# Patient Record
Sex: MALE | Race: WHITE | Employment: OTHER | URBAN - METROPOLITAN AREA
[De-identification: names, ages, dates, MRNs, and addresses within clinical notes are randomized per-mention and may not be internally consistent; named-entity substitution may affect disease eponyms.]

---

## 2017-08-22 ENCOUNTER — APPOINTMENT (OUTPATIENT)
Dept: GENERAL RADIOLOGY | Age: 72
DRG: 536 | End: 2017-08-22
Attending: PHYSICIAN ASSISTANT
Payer: MEDICARE

## 2017-08-22 ENCOUNTER — HOSPITAL ENCOUNTER (INPATIENT)
Age: 72
LOS: 3 days | Discharge: NURSING, REHAB OR LONG-TERM CARE | DRG: 536 | End: 2017-08-25
Attending: FAMILY MEDICINE | Admitting: HOSPITALIST
Payer: MEDICARE

## 2017-08-22 ENCOUNTER — APPOINTMENT (OUTPATIENT)
Dept: CT IMAGING | Age: 72
DRG: 536 | End: 2017-08-22
Attending: PHYSICIAN ASSISTANT
Payer: MEDICARE
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
## 2017-08-22 DIAGNOSIS — S72.002A CLOSED LEFT HIP FRACTURE, INITIAL ENCOUNTER (HCC): Primary | ICD-10-CM

## 2017-08-22 LAB
ALBUMIN SERPL-MCNC: 3.5 G/DL (ref 3.4–5)
ALBUMIN/GLOB SERPL: 0.9 {RATIO} (ref 0.8–1.7)
ALP SERPL-CCNC: 62 U/L (ref 45–117)
ALT SERPL-CCNC: 33 U/L (ref 16–61)
ANION GAP SERPL CALC-SCNC: 7 MMOL/L (ref 3–18)
APPEARANCE UR: CLEAR
APTT PPP: 25.8 SEC (ref 23–36.4)
AST SERPL-CCNC: 35 U/L (ref 15–37)
BASOPHILS # BLD: 0 K/UL (ref 0–0.06)
BASOPHILS NFR BLD: 0 % (ref 0–2)
BILIRUB SERPL-MCNC: 0.3 MG/DL (ref 0.2–1)
BILIRUB UR QL: NEGATIVE
BUN SERPL-MCNC: 27 MG/DL (ref 7–18)
BUN/CREAT SERPL: 36 (ref 12–20)
CALCIUM SERPL-MCNC: 8.8 MG/DL (ref 8.5–10.1)
CHLORIDE SERPL-SCNC: 105 MMOL/L (ref 100–108)
CO2 SERPL-SCNC: 28 MMOL/L (ref 21–32)
COLOR UR: YELLOW
CREAT SERPL-MCNC: 0.76 MG/DL (ref 0.6–1.3)
DIFFERENTIAL METHOD BLD: ABNORMAL
EOSINOPHIL # BLD: 0 K/UL (ref 0–0.4)
EOSINOPHIL NFR BLD: 0 % (ref 0–5)
ERYTHROCYTE [DISTWIDTH] IN BLOOD BY AUTOMATED COUNT: 12.6 % (ref 11.6–14.5)
GLOBULIN SER CALC-MCNC: 3.9 G/DL (ref 2–4)
GLUCOSE SERPL-MCNC: 93 MG/DL (ref 74–99)
GLUCOSE UR STRIP.AUTO-MCNC: NEGATIVE MG/DL
HCT VFR BLD AUTO: 38.4 % (ref 36–48)
HGB BLD-MCNC: 12.6 G/DL (ref 13–16)
HGB UR QL STRIP: NEGATIVE
INR PPP: 1.1 (ref 0.8–1.2)
KETONES UR QL STRIP.AUTO: NEGATIVE MG/DL
LEUKOCYTE ESTERASE UR QL STRIP.AUTO: NEGATIVE
LYMPHOCYTES # BLD: 1.9 K/UL (ref 0.9–3.6)
LYMPHOCYTES NFR BLD: 13 % (ref 21–52)
MCH RBC QN AUTO: 30 PG (ref 24–34)
MCHC RBC AUTO-ENTMCNC: 32.8 G/DL (ref 31–37)
MCV RBC AUTO: 91.4 FL (ref 74–97)
MONOCYTES # BLD: 1 K/UL (ref 0.05–1.2)
MONOCYTES NFR BLD: 7 % (ref 3–10)
NEUTS SEG # BLD: 11.7 K/UL (ref 1.8–8)
NEUTS SEG NFR BLD: 80 % (ref 40–73)
NITRITE UR QL STRIP.AUTO: NEGATIVE
PH UR STRIP: 7 [PH] (ref 5–8)
PLATELET # BLD AUTO: 205 K/UL (ref 135–420)
PMV BLD AUTO: 9.3 FL (ref 9.2–11.8)
POTASSIUM SERPL-SCNC: 4 MMOL/L (ref 3.5–5.5)
PROT SERPL-MCNC: 7.4 G/DL (ref 6.4–8.2)
PROT UR STRIP-MCNC: NEGATIVE MG/DL
PROTHROMBIN TIME: 13.5 SEC (ref 11.5–15.2)
RBC # BLD AUTO: 4.2 M/UL (ref 4.7–5.5)
RBC MORPH BLD: ABNORMAL
SODIUM SERPL-SCNC: 140 MMOL/L (ref 136–145)
SP GR UR REFRACTOMETRY: 1.01 (ref 1–1.03)
UROBILINOGEN UR QL STRIP.AUTO: 0.2 EU/DL (ref 0.2–1)
WBC # BLD AUTO: 14.6 K/UL (ref 4.6–13.2)

## 2017-08-22 PROCEDURE — 93005 ELECTROCARDIOGRAM TRACING: CPT

## 2017-08-22 PROCEDURE — 96361 HYDRATE IV INFUSION ADD-ON: CPT

## 2017-08-22 PROCEDURE — 85610 PROTHROMBIN TIME: CPT | Performed by: PHYSICIAN ASSISTANT

## 2017-08-22 PROCEDURE — 74011250636 HC RX REV CODE- 250/636: Performed by: INTERNAL MEDICINE

## 2017-08-22 PROCEDURE — 85025 COMPLETE CBC W/AUTO DIFF WBC: CPT | Performed by: PHYSICIAN ASSISTANT

## 2017-08-22 PROCEDURE — 51702 INSERT TEMP BLADDER CATH: CPT

## 2017-08-22 PROCEDURE — 73700 CT LOWER EXTREMITY W/O DYE: CPT

## 2017-08-22 PROCEDURE — 72110 X-RAY EXAM L-2 SPINE 4/>VWS: CPT

## 2017-08-22 PROCEDURE — 71010 XR CHEST PORT: CPT

## 2017-08-22 PROCEDURE — 74011250637 HC RX REV CODE- 250/637: Performed by: PHYSICIAN ASSISTANT

## 2017-08-22 PROCEDURE — 81003 URINALYSIS AUTO W/O SCOPE: CPT | Performed by: PHYSICIAN ASSISTANT

## 2017-08-22 PROCEDURE — 65270000029 HC RM PRIVATE

## 2017-08-22 PROCEDURE — 85730 THROMBOPLASTIN TIME PARTIAL: CPT | Performed by: PHYSICIAN ASSISTANT

## 2017-08-22 PROCEDURE — 99285 EMERGENCY DEPT VISIT HI MDM: CPT

## 2017-08-22 PROCEDURE — 77030034849

## 2017-08-22 PROCEDURE — 73502 X-RAY EXAM HIP UNI 2-3 VIEWS: CPT

## 2017-08-22 PROCEDURE — 74011250636 HC RX REV CODE- 250/636: Performed by: PHYSICIAN ASSISTANT

## 2017-08-22 PROCEDURE — 96374 THER/PROPH/DIAG INJ IV PUSH: CPT

## 2017-08-22 PROCEDURE — 80053 COMPREHEN METABOLIC PANEL: CPT | Performed by: PHYSICIAN ASSISTANT

## 2017-08-22 RX ORDER — SODIUM CHLORIDE 9 MG/ML
75 INJECTION, SOLUTION INTRAVENOUS CONTINUOUS
Status: DISCONTINUED | OUTPATIENT
Start: 2017-08-23 | End: 2017-08-25 | Stop reason: HOSPADM

## 2017-08-22 RX ORDER — HYDROMORPHONE HYDROCHLORIDE 1 MG/ML
1 INJECTION, SOLUTION INTRAMUSCULAR; INTRAVENOUS; SUBCUTANEOUS
Status: DISCONTINUED | OUTPATIENT
Start: 2017-08-22 | End: 2017-08-24

## 2017-08-22 RX ORDER — FENTANYL CITRATE 50 UG/ML
50 INJECTION, SOLUTION INTRAMUSCULAR; INTRAVENOUS ONCE
Status: COMPLETED | OUTPATIENT
Start: 2017-08-22 | End: 2017-08-22

## 2017-08-22 RX ORDER — HYDROMORPHONE HYDROCHLORIDE 1 MG/ML
1 INJECTION, SOLUTION INTRAMUSCULAR; INTRAVENOUS; SUBCUTANEOUS ONCE
Status: COMPLETED | OUTPATIENT
Start: 2017-08-22 | End: 2017-08-22

## 2017-08-22 RX ORDER — ONDANSETRON 4 MG/1
4 TABLET, ORALLY DISINTEGRATING ORAL
Status: COMPLETED | OUTPATIENT
Start: 2017-08-22 | End: 2017-08-22

## 2017-08-22 RX ADMIN — SODIUM CHLORIDE 75 ML/HR: 900 INJECTION, SOLUTION INTRAVENOUS at 23:34

## 2017-08-22 RX ADMIN — SODIUM CHLORIDE 1000 ML: 900 INJECTION, SOLUTION INTRAVENOUS at 13:56

## 2017-08-22 RX ADMIN — HYDROMORPHONE HYDROCHLORIDE 1 MG: 1 INJECTION, SOLUTION INTRAMUSCULAR; INTRAVENOUS; SUBCUTANEOUS at 20:46

## 2017-08-22 RX ADMIN — FENTANYL CITRATE 50 MCG: 50 INJECTION, SOLUTION INTRAMUSCULAR; INTRAVENOUS at 11:36

## 2017-08-22 RX ADMIN — ONDANSETRON 4 MG: 4 TABLET, ORALLY DISINTEGRATING ORAL at 11:36

## 2017-08-22 NOTE — ED TRIAGE NOTES
Pt w/ cutting the grass around 0930 when he tripped and fell. Pt denies LOC or head trauma but reports left hip pain, rated 4/10. Pt able to move LLE but reports that he is only able to bear minimal weight on that leg, PVS intact. ELISA Mckeon to bedside and orders to be placed for pain medication per protocol. Sister at bedside. Sepsis Screening completed    (  )Patient meets SIRS criteria. ( X )Patient does not meet SIRS criteria.       SIRS Criteria is achieved when two or more of the following are present   Temperature < 96.8°F (36°C) or > 100.9°F (38.3°C)   Heart Rate > 90 beats per minute   Respiratory Rate > 20 breaths per minute   WBC count > 12,000 or <4,000 or > 10% bands

## 2017-08-22 NOTE — ED NOTES
Bed linens changed due to urine spilling onto sheets. Warm blankets provided. Pt assisted to log roll. Tolerated procedure well. Pt resting in stretcher with HOB elevated. Bowers catheter to be placed and urine specimen sent to lab. CT scan pending. Pt in NAD at this time. Sister remains at bedside.

## 2017-08-22 NOTE — ED PROVIDER NOTES
HPI Comments: 11:20 AM  Nagi Bee is a 71 y.o. male with a PMHx of mild mental retardation, BPH, COPD, who presents to the ED c/o left hip pain s/p mechanical fall which occurred 2 hours PTA. Pt states he tripped while he was cutting the grass. Pain to left buttocks. Worse with movement. Better with rest. Associated sxs include a small abrasion around the left knee. Pt takes Advil for arthralgias and Centrum. SHx of 2 laminectomies (one with a fixture), 2 shoulder surgeries, and right hip ORIF (s/p fall and fx). Pt is followed by [facility]. Last Tdap unknown. Patient denies other pain, head trauma, HA, LOC, and any other sxs or complaints. Patient is a 71 y.o. male presenting with fall. The history is provided by the patient and a caregiver. No  was used. Fall   The accident occurred 1 to 2 hours ago. Fall occurred: cutting grass. The pain is present in the left hip. Pertinent negatives include no numbness, no vomiting and no loss of consciousness. The risk factors include recurrent falls (has hx of fall, mild mental retardation). It is unknown when the patient last had a tetanus shot.         Past Medical History:   Diagnosis Date    Arthritis     DJD    BPH (benign prostatic hypertrophy)     Cataracts, bilateral     Chronic kidney disease     stone    COPD     Dx with mild COPD- [facility])     Ill-defined condition     dermatitis- side of mouth    Lymphedema of lower extremity     right    Mild mental retardation     high functioning       Past Surgical History:   Procedure Laterality Date    HX GI  2005    colonoscopy    HX HERNIA REPAIR      right inguinal as a child    HX LUMBAR FUSION  2008    L2-3    HX LUMBAR FUSION  4-22-13    L2-5    HX MOHS PROCEDURES Left     HX ORTHOPAEDIC      right shoulder and hip    HX ORTHOPAEDIC  2007    right hip pinning    HX ORTHOPAEDIC  2012    right total shoulder    HX PROSTATECTOMY 8-1-13    Greenlight laser    HX UROLOGICAL  8-1-2013    green light laser    SPINE SURGERY PROCEDURE UNLISTED           Family History:   Problem Relation Age of Onset    Malignant Hyperthermia Neg Hx     Pseudocholinesterase Deficiency Neg Hx     Delayed Awakening Neg Hx     Post-op Nausea/Vomiting Neg Hx     Emergence Delirium Neg Hx     Post-op Cognitive Dysfunction Neg Hx     Cancer Mother      colon cancer       Social History     Social History    Marital status: SINGLE     Spouse name: N/A    Number of children: N/A    Years of education: N/A     Occupational History    Not on file. Social History Main Topics    Smoking status: Former Smoker     Quit date: 7/25/2008    Smokeless tobacco: Never Used      Comment: Occ. small cigar    Alcohol use 0.5 oz/week     1 Cans of beer per week      Comment: ocassional (Once every 2 weeks).  Drug use: No    Sexual activity: Not on file     Other Topics Concern    Metallic Foreign Body Yes     rt hip and rt arm metal     Social History Narrative    No narrative on file         ALLERGIES: Milk    Review of Systems   Constitutional: Positive for activity change. HENT: Negative for facial swelling. Respiratory: Negative for shortness of breath. Cardiovascular: Negative for chest pain. Gastrointestinal: Negative for vomiting. Musculoskeletal: Positive for arthralgias (left hip pain) and gait problem. Negative for neck pain and neck stiffness. (-) any other pain   Skin: Positive for wound (small abrasion around the left knee). Neurological: Negative for loss of consciousness, syncope and numbness. Hematological: Negative for adenopathy. All other systems reviewed and are negative.       Vitals:    08/22/17 1530 08/22/17 1600 08/22/17 1700 08/22/17 1730   BP: 137/58 136/65 127/71 131/71   Pulse:  72  72   Resp:  18  18   Temp:    99.8 °F (37.7 °C)   SpO2: 97% 99% 98% 98%   Weight:       Height:                Physical Exam Constitutional: He is oriented to person, place, and time. He appears well-developed and well-nourished. No distress.  geriatric male in NAD. Alert. Answering questions. Slow to respond at times but oriented. HENT:   Head: Normocephalic and atraumatic. Eyes: Conjunctivae are normal.   Neck: Normal range of motion. No spinous process tenderness and no muscular tenderness present. No rigidity. No edema, no erythema and normal range of motion present. Cardiovascular: Normal rate, regular rhythm, normal heart sounds and intact distal pulses. Exam reveals no friction rub. No murmur heard. Pulses:       Dorsalis pedis pulses are 2+ on the right side, and 2+ on the left side. Posterior tibial pulses are 2+ on the right side, and 2+ on the left side. Pulmonary/Chest: Effort normal and breath sounds normal. No respiratory distress. He has no wheezes. He has no rales. Musculoskeletal:        Left hip: He exhibits decreased range of motion and tenderness. Left upper leg: He exhibits no tenderness, no bony tenderness, no swelling and no edema. Legs:  Neurological: He is alert and oriented to person, place, and time. Skin: He is not diaphoretic. Nursing note and vitals reviewed. RESULTS:    PULSE OXIMETRY NOTE:  Pulse-ox is 96% on RA  Interpretation: Normal       EKG interpretation: (Preliminary)  Read at 1:40 PM by ELIAN Faulkner; 74 bpm; No ST Elevation  Written by Brynn Spangler ED Scribángel    XR SPINE LUMB MIN 4 V   Final Result   IMPRESSION:    1. Extension of posterior instrumented spinal fusion in the interval from prior  lumbar spine radiographs of July 22, 2012. Lucency is noted surrounding all  paired pedicular screws, greatest at L2. Comparison with more recent prior  imaging of the lumbar spine is recommended.  If no more recent comparison imaging  is available, further evaluation with nonemergent flexion and extension  projections may be helpful for evaluation of potential instability. 2. No acute radiographic abnormality involving the lumbar spine. 3. Trace anterolisthesis L4 on L5. As read by the radiologist.     XR HIP LT W OR WO PELV 2-3 VWS   Final Result   IMPRESSION:  1. Nondisplaced, mildly comminuted fracture of the left hip greater trochanter,  with slight extension into the proximal intertrochanteric femur. 2. Healed, reduced, and nailed proximal right femoral fracture. As read by the radiologist.      XR CHEST PORT   Final Result   IMPRESSION:     No acute cardiopulmonary disease or significant change. Mild parenchymal  scarring.       As read by the radiologist.     CT Left hip w/o contrast  IMPRESSION:     1. Comminuted minimally displaced fracture of the left greater trochanter  without dislocation of the left femoral head. As read by radiology    Labs Reviewed   CBC WITH AUTOMATED DIFF - Abnormal; Notable for the following:        Result Value    WBC 14.6 (*)     RBC 4.20 (*)     HGB 12.6 (*)     NEUTROPHILS 80 (*)     LYMPHOCYTES 13 (*)     ABS. NEUTROPHILS 11.7 (*)     All other components within normal limits   METABOLIC PANEL, COMPREHENSIVE - Abnormal; Notable for the following:     BUN 27 (*)     BUN/Creatinine ratio 36 (*)     All other components within normal limits   PROTHROMBIN TIME + INR   PTT   URINALYSIS W/ RFLX MICROSCOPIC       Recent Results (from the past 12 hour(s))   CBC WITH AUTOMATED DIFF    Collection Time: 08/22/17  1:30 PM   Result Value Ref Range    WBC 14.6 (H) 4.6 - 13.2 K/uL    RBC 4.20 (L) 4.70 - 5.50 M/uL    HGB 12.6 (L) 13.0 - 16.0 g/dL    HCT 38.4 36.0 - 48.0 %    MCV 91.4 74.0 - 97.0 FL    MCH 30.0 24.0 - 34.0 PG    MCHC 32.8 31.0 - 37.0 g/dL    RDW 12.6 11.6 - 14.5 %    PLATELET 815 710 - 024 K/uL    MPV 9.3 9.2 - 11.8 FL    NEUTROPHILS 80 (H) 40 - 73 %    LYMPHOCYTES 13 (L) 21 - 52 %    MONOCYTES 7 3 - 10 %    EOSINOPHILS 0 0 - 5 %    BASOPHILS 0 0 - 2 %    ABS.  NEUTROPHILS 11.7 (H) 1.8 - 8.0 K/UL ABS. LYMPHOCYTES 1.9 0.9 - 3.6 K/UL    ABS. MONOCYTES 1.0 0.05 - 1.2 K/UL    ABS. EOSINOPHILS 0.0 0.0 - 0.4 K/UL    ABS. BASOPHILS 0.0 0.0 - 0.06 K/UL    RBC COMMENTS NORMOCYTIC, NORMOCHROMIC      DF AUTOMATED     METABOLIC PANEL, COMPREHENSIVE    Collection Time: 08/22/17  1:30 PM   Result Value Ref Range    Sodium 140 136 - 145 mmol/L    Potassium 4.0 3.5 - 5.5 mmol/L    Chloride 105 100 - 108 mmol/L    CO2 28 21 - 32 mmol/L    Anion gap 7 3.0 - 18 mmol/L    Glucose 93 74 - 99 mg/dL    BUN 27 (H) 7.0 - 18 MG/DL    Creatinine 0.76 0.6 - 1.3 MG/DL    BUN/Creatinine ratio 36 (H) 12 - 20      GFR est AA >60 >60 ml/min/1.73m2    GFR est non-AA >60 >60 ml/min/1.73m2    Calcium 8.8 8.5 - 10.1 MG/DL    Bilirubin, total 0.3 0.2 - 1.0 MG/DL    ALT (SGPT) 33 16 - 61 U/L    AST (SGOT) 35 15 - 37 U/L    Alk.  phosphatase 62 45 - 117 U/L    Protein, total 7.4 6.4 - 8.2 g/dL    Albumin 3.5 3.4 - 5.0 g/dL    Globulin 3.9 2.0 - 4.0 g/dL    A-G Ratio 0.9 0.8 - 1.7     PROTHROMBIN TIME + INR    Collection Time: 08/22/17  1:30 PM   Result Value Ref Range    Prothrombin time 13.5 11.5 - 15.2 sec    INR 1.1 0.8 - 1.2     PTT    Collection Time: 08/22/17  1:30 PM   Result Value Ref Range    aPTT 25.8 23.0 - 36.4 SEC   EKG, 12 LEAD, INITIAL    Collection Time: 08/22/17  1:39 PM   Result Value Ref Range    Ventricular Rate 74 BPM    Atrial Rate 74 BPM    P-R Interval 170 ms    QRS Duration 88 ms    Q-T Interval 366 ms    QTC Calculation (Bezet) 406 ms    Calculated P Axis 21 degrees    Calculated R Axis -53 degrees    Calculated T Axis 0 degrees    Diagnosis       Normal sinus rhythm  Left anterior fascicular block  Possible Lateral infarct , age undetermined  Abnormal ECG  When compared with ECG of 16-APR-2013 14:36,  Borderline criteria for Lateral infarct are now present     URINALYSIS W/ RFLX MICROSCOPIC    Collection Time: 08/22/17  2:15 PM   Result Value Ref Range    Color YELLOW      Appearance CLEAR      Specific gravity 1.010 1.005 - 1.030      pH (UA) 7.0 5.0 - 8.0      Protein NEGATIVE  NEG mg/dL    Glucose NEGATIVE  NEG mg/dL    Ketone NEGATIVE  NEG mg/dL    Bilirubin NEGATIVE  NEG      Blood NEGATIVE  NEG      Urobilinogen 0.2 0.2 - 1.0 EU/dL    Nitrites NEGATIVE  NEG      Leukocyte Esterase NEGATIVE  NEG         MDM  Number of Diagnoses or Management Options  Closed left hip fracture, initial encounter [facility]): Fall from slip, trip, or stumble, initial encounter:   Diagnosis management comments: Fx, sprain, strain, dislocation, ligamentous injury       Amount and/or Complexity of Data Reviewed  Clinical lab tests: ordered and reviewed  Tests in the radiology section of CPT®: ordered and reviewed (CXR, XR left hip, XR L-Spine)  Tests in the medicine section of CPT®: ordered and reviewed (ekg)  Independent visualization of images, tracings, or specimens: yes (CXR, XR left hip, XR L-Spine, EKG)      ED Course     MEDICATIONS GIVEN:  Medications   fentaNYL citrate (PF) injection 50 mcg (50 mcg IntraNASal Given 8/22/17 1136)   ondansetron (ZOFRAN ODT) tablet 4 mg (4 mg Oral Given 8/22/17 1136)   sodium chloride 0.9 % bolus infusion 1,000 mL (0 mL IntraVENous IV Completed 8/22/17 1600)       Procedures    PROGRESS NOTE:   11:20 AM  Initial assesment performed. Written by Nicole Gill, ED Scribe, as dictated by James Hayes PA-C.     CONSULT NOTE:   1:39 PM  Faith Underwood with Fabien Oden MD   Specialty: Orthopedics  Discussed pt's hx, disposition, and available diagnostic and imaging results over the telephone. Reviewed care plans. Consulting physician agrees with plans as outlined. Advises a left hip CT. PROGRESS NOTE:   2:20 PM  Pt has been re-examined by James Hayes PA-C  Pt would like to see [facility] and not the on call clinician for a repair as they have an existing relationship with this practice. Will contact on call doc for [facility]. Waiting on CT.     CONSULT NOTE:   3:05 PM  Faith Underwood with Jeff Hawk MD Specialty: [id number] [facility] with workup, agrees CT is need to better define the fx. Advises talking with medicine for admission, as this is the policy to admit all hip fxs. CONSULT NOTE:   3:23 PM  Tacos Francois PA-C spoke with Aden Nuñez MD   Specialty: Hospitalist  Discussed pt's hx, disposition, and available diagnostic and imaging results. Reviewed care plans. Consulting physician agrees with plans as outlined. Agrees to admit pt to Orthopedics. Accepts the pt, but states that his understanding was that hip fxs that do not need medical clearance should go to orthopedist as the principle admitting clinician. I discussed the pts PMH, also discussed that I brought this up with Dr. Angelina Dolan, and Dr. Beau Garland understanding is that all hip fxs go to medicine. Discussed that they may discuss who will be the principle admitting physician, and will admit to Orthopedics under Dr. Carlyle Nyhan. Written by Miguelito Padilla, ED Scribe, as dictated by Tacos Francois PA-C    ADMISSION NOTE:  3:23 PM  Patient is being admitted to Orthopedics by Aden Nuñez MD. The results of their tests and reasons for their admission have been discussed with them and/or available family. They convey agreement and understanding for the need to be admitted and for their admission diagnosis. Written by Miguelito Padilla, AIDAN Scribe, as dictated by Tacos Francois PA-C.    CONDITIONS ON ADMISSION:  Deep Vein Thrombosis is not present at the time of admission. Thrombosis is not present at the time of admission. Urinary Tract Infection is not present at the time of admission. Pneumonia is not present at the time of admission. MRSA is not present at the time of admission. Wound infection is not present at the time of admission. Pressure Ulcer is not present at the time of admission. CLINICAL IMPRESSION    1. Closed left hip fracture, initial encounter ([facility] [address]    2.  Fall from slip, trip, or stumble, initial encounter          3:25 PM  I have spent 30 minutes of critical care time involved in lab review, consultations with specialist, family decision-making, and documentation. During this entire length of time I was immediately available to the patient. Critical Care: The reason for providing this level of medical care for this critically ill patient was due a critical illness that impaired one or more vital organ systems such that there was a high probability of imminent or life threatening deterioration in the patients condition. This care involved high complexity decision making to assess, manipulate, and support vital system functions, to treat this degreee vital organ system failure and to prevent further life threatening deterioration of the patients condition. ATTESTATION:  This note is prepared by Ag Grayson, acting as a Scribe for Radha Koehler PA-C on 11:20 AM on 8/22/2017 .     Radha Koehler PA-C: The scribe's documentation has been prepared under my direction and personally reviewed by me in its entirety.'

## 2017-08-22 NOTE — ED NOTES
Rounded on pt. Pt resting in stretcher in NAD at this time with sister at bedside. Pt continues to deny needing pain medication. Will continue to re-assess. Bowers draining clear, yellow urine. Call light within reach. Pt updated on plan of care regarding IP admission and pending CT scan. No needs verbalized at this time.

## 2017-08-22 NOTE — ED NOTES
Bowers catheter placed, draining large amount of clear, yellow urine. Pt tolerated procedure well. Pt currently rates pain as 5/10. Denies wanting pain medication at this time. Will re-assess later. CT scan pending. Call light within reach. Sister to bedside.

## 2017-08-22 NOTE — ED NOTES
Rounded on pt. I&O measured. Pt in NAD at this time. Pt updated on plan of care. No needs verbalized at this time.

## 2017-08-22 NOTE — ROUTINE PROCESS
TRANSFER - OUT REPORT:    Verbal report given to Dillon Vides LPN on [facility]  being transferred to Surgical for routine progression of care       Report consisted of patients Situation, Background, Assessment and   Recommendations(SBAR). Information from the following report(s) SBAR, ED Summary, Intake/Output, MAR, Recent Results and Cardiac Rhythm NSR was reviewed with the receiving nurse. Lines:   Peripheral IV 08/22/17 Left Antecubital (Active)   Site Assessment Clean, dry, & intact 8/22/2017  1:31 PM   Phlebitis Assessment 0 8/22/2017  1:31 PM   Infiltration Assessment 0 8/22/2017  1:31 PM   Dressing Status Clean, dry, & intact 8/22/2017  1:31 PM   Dressing Type Transparent 8/22/2017  1:31 PM   Hub Color/Line Status Patent; Flushed 8/22/2017  1:31 PM   Action Taken Blood drawn 8/22/2017  1:31 PM        Opportunity for questions and clarification was provided.       Patient transported with:   ilustrum

## 2017-08-23 LAB
ANION GAP SERPL CALC-SCNC: 7 MMOL/L (ref 3–18)
BUN SERPL-MCNC: 25 MG/DL (ref 7–18)
BUN/CREAT SERPL: 27 (ref 12–20)
CALCIUM SERPL-MCNC: 8.9 MG/DL (ref 8.5–10.1)
CHLORIDE SERPL-SCNC: 104 MMOL/L (ref 100–108)
CO2 SERPL-SCNC: 27 MMOL/L (ref 21–32)
CREAT SERPL-MCNC: 0.91 MG/DL (ref 0.6–1.3)
ERYTHROCYTE [DISTWIDTH] IN BLOOD BY AUTOMATED COUNT: 13 % (ref 11.6–14.5)
GLUCOSE SERPL-MCNC: 114 MG/DL (ref 74–99)
HCT VFR BLD AUTO: 37.8 % (ref 36–48)
HGB BLD-MCNC: 12.2 G/DL (ref 13–16)
MCH RBC QN AUTO: 30.1 PG (ref 24–34)
MCHC RBC AUTO-ENTMCNC: 32.3 G/DL (ref 31–37)
MCV RBC AUTO: 93.3 FL (ref 74–97)
PLATELET # BLD AUTO: 195 K/UL (ref 135–420)
PMV BLD AUTO: 9.4 FL (ref 9.2–11.8)
POTASSIUM SERPL-SCNC: 4 MMOL/L (ref 3.5–5.5)
RBC # BLD AUTO: 4.05 M/UL (ref 4.7–5.5)
SODIUM SERPL-SCNC: 138 MMOL/L (ref 136–145)
WBC # BLD AUTO: 12.7 K/UL (ref 4.6–13.2)

## 2017-08-23 PROCEDURE — 97162 PT EVAL MOD COMPLEX 30 MIN: CPT

## 2017-08-23 PROCEDURE — 65270000029 HC RM PRIVATE

## 2017-08-23 PROCEDURE — 97165 OT EVAL LOW COMPLEX 30 MIN: CPT

## 2017-08-23 PROCEDURE — 80048 BASIC METABOLIC PNL TOTAL CA: CPT | Performed by: HOSPITALIST

## 2017-08-23 PROCEDURE — 36415 COLL VENOUS BLD VENIPUNCTURE: CPT | Performed by: HOSPITALIST

## 2017-08-23 PROCEDURE — 85027 COMPLETE CBC AUTOMATED: CPT | Performed by: HOSPITALIST

## 2017-08-23 PROCEDURE — 97530 THERAPEUTIC ACTIVITIES: CPT

## 2017-08-23 PROCEDURE — 74011250636 HC RX REV CODE- 250/636: Performed by: INTERNAL MEDICINE

## 2017-08-23 RX ORDER — VITAMIN E 1000 UNIT
1000 CAPSULE ORAL 2 TIMES DAILY WITH MEALS
COMMUNITY

## 2017-08-23 RX ADMIN — HYDROMORPHONE HYDROCHLORIDE 1 MG: 1 INJECTION, SOLUTION INTRAMUSCULAR; INTRAVENOUS; SUBCUTANEOUS at 12:06

## 2017-08-23 RX ADMIN — SODIUM CHLORIDE 75 ML/HR: 900 INJECTION, SOLUTION INTRAVENOUS at 23:11

## 2017-08-23 RX ADMIN — HYDROMORPHONE HYDROCHLORIDE 1 MG: 1 INJECTION, SOLUTION INTRAMUSCULAR; INTRAVENOUS; SUBCUTANEOUS at 18:47

## 2017-08-23 NOTE — CONSULTS
Consult Note    Patient: Dino Metcalf               Sex: male          DOA: 8/22/2017         YOB: 1945      Age:  71 y.o.        LOS:  LOS: 1 day              HPI:     Dino Metcalf is a 71 y.o. male who has been seen for Left hip nondisplaced greater trochanter fracture. Patient states he was mowing his lawn yesterday and fell landing on his left hip. He denies any other pain. No increase in pain to the right hip noted or any other ortho complaints. He is tolerating pain medications. He denies any LOC. Normally walks without assistive devices.       Past Medical History:   Diagnosis Date    Arthritis     DJD    BPH (benign prostatic hypertrophy)     Cataracts, bilateral     COPD     Dx with mild COPD- Ruled out by Dr. [facility])     Ill-defined condition     dermatitis- side of mouth    Lymphedema of lower extremity     right    Mild mental retardation     high functioning       Past Surgical History:   Procedure Laterality Date    HX GI  2005    colonoscopy    HX HERNIA REPAIR      right inguinal as a child    HX LUMBAR FUSION  2008    L2-3    HX LUMBAR FUSION  4-22-13    L2-5    HX MOHS PROCEDURES Left     HX ORTHOPAEDIC      right shoulder and hip    HX ORTHOPAEDIC  2007    right hip pinning    HX ORTHOPAEDIC  2012    right total shoulder    HX PROSTATECTOMY  8-1-13    Greenlight laser    HX UROLOGICAL  8-1-2013    green light laser    SPINE SURGERY PROCEDURE UNLISTED         Family History   Problem Relation Age of Onset    Malignant Hyperthermia Neg Hx     Pseudocholinesterase Deficiency Neg Hx     Delayed Awakening Neg Hx     Post-op Nausea/Vomiting Neg Hx     Emergence Delirium Neg Hx     Post-op Cognitive Dysfunction Neg Hx     Cancer Mother      colon cancer       Social History     Social History    Marital status: SINGLE     Spouse name: N/A    Number of children: N/A    Years of education: N/A     Social History Main Topics  Smoking status: Former Smoker     Quit date: 7/25/2008    Smokeless tobacco: Never Used      Comment: Occ. small cigar    Alcohol use 0.5 oz/week     1 Cans of beer per week      Comment: ocassional (Once every 2 weeks).  Drug use: No    Sexual activity: Not on file     Other Topics Concern    Metallic Foreign Body Yes     rt hip and rt arm metal     Social History Narrative       Prior to Admission medications    Medication Sig Start Date End Date Taking? Authorizing Provider   ascorbic acid, vitamin C, (VITAMIN C) 1,000 mg tablet Take 1,000 mg by mouth two (2) times daily (with meals). Yes Historical Provider   ibuprofen (ADVIL) 200 mg tablet Take 400 mg by mouth two (2) times a day. Yes Historical Provider   multivitamins-minerals-lutein (CENTRUM SILVER) Tab Take 1 Tab by mouth daily. Yes Historical Provider       Allergies   Allergen Reactions    Milk Other (comments)     MILK AND MILK PRODUCTS - SEVERE GALACTOSEMIA       Review of Systems  A comprehensive review of systems was negative except for that written in the History of Present Illness. Physical Exam:      Visit Vitals    /59 (BP 1 Location: Right arm, BP Patient Position: At rest)    Pulse 70    Temp 98.3 °F (36.8 °C)    Resp 18    Ht 5' 7\" (1.702 m)    Wt 84.8 kg (187 lb)    SpO2 99%    BMI 29.29 kg/m2       Physical Exam:  No real pain with passive ROM of the left hip. He has no pain with log rolling of the left hip. He does have tenderness with palpation of the left hip greater trochanteric region. No leg length discrepancy is noted. He is intact to light touch and sensation throughout the LLE and has 5/5 DF, PF and EHL. Some pain is noted with active ROM of the Left hip. He is non tender with full ROM of the bilateral upper extremities, spine, thorax, and RLE. Skin intact throughout. Labs Reviewed:  Xray:  Left hip 8/22/17      1.  Nondisplaced, mildly comminuted fracture of the left hip greater trochanter,  with slight extension into the proximal intertrochanteric femur. 2. Healed, reduced, and nailed proximal right femoral fracture. CT scan:  Left hip 8/22/17    1. Comminuted minimally displaced fracture of the left greater trochanter  without dislocation of the left femoral head. Assessment/Plan     Principal Problem:    Closed left hip fracture ([address] (8/22/2017)    Active Problems:    Fall (7/25/2012)      [facility]      Patient was seen and examined by Dr. Radha Brown and the plan was discussed with Dr. Radha Brown. Patient was also seen and examined by Dr. Carlie Hebert. No surgery recommended. Patient is to be toe touch weight bearing LLE. Pain medications as per medical team.  Plan should be for rehab placement at discharge. Patient should follow up in 2 weeks with Dr. Radha Brown for repeat xrays. Able to sign off from ortho perspective.

## 2017-08-23 NOTE — PROGRESS NOTES
Problem: Falls - Risk of  Goal: *Absence of Falls  Document Rosana Fall Risk and appropriate interventions in the flowsheet.    Outcome: Progressing Towards Goal  Fall Risk Interventions:

## 2017-08-23 NOTE — ROUTINE PROCESS
Bedside and Verbal shift change report given to Franky Acuna RN (oncoming nurse) by Richie Nolen RN (offgoing nurse). Report included the following information SBAR and Kardex.

## 2017-08-23 NOTE — PROGRESS NOTES
1940 Assumed care of patient from [facility]. Shift assessment initiated. All questions and concerns answered. All needs met. Pain voiced a 3/10, will call for medication orders. Call bell and phone within reach. ICS encouraged. Bed in the lowest position. Ice on site. No further needs at this time. No signs of distress note. Will continue to monitor for changes. 2034 Hospitalist paged. TC orders by Chichi Mckeon for Dilaudid 1mg ONCE for pain management. 2047 Pain voiced at 4/10, medicated per MAR. No further needs at this time. Family at bedside. 2250 Per Dr. William Monk, toe touch weightbearing. Dr. Chichi Mckeon paged for additional orders. Q4PRN Dilaudid 1mg IV and 75cc/hr NS. Bedside and Verbal shift change report given to RYLEE Zuñiga RN (oncoming nurse) by BALDEV Harris RN (offgoing nurse). Report included the following information SBAR, Kardex, MAR, Recent Results and Med Rec Status.

## 2017-08-23 NOTE — PROGRESS NOTES
7587 - Bedside report received from Tracey Sampson RN. Patient up in bed at this time. Pain 3/10. Plan of care for the day addressed with the patient. 3579 - Patient in bed at this time. A/O x 4. IV to left AC  intact and patent. Denies numbness/tingling. Pedal pulses palpable. Lungs clear. Bruising to left leg. Abrasions to left elbow and knee. Patient remains NPO.  TTWB. Bowel sounds active to all quadrants. Pain 2/10.     1430 - Spoke with ELISA Klein concerning patient seeing Dr. Radames Rascon today. PA to contact Dr. Radames Rascon. 1440 - Spoke with Dr. Radames Rascon who states that patient will not have surgery, will need rehab, and remain TTWB. PT and CM made aware. 1918 - Bedside and Verbal shift change report given to Ashley Mccloud RN by Reed Pepper RN. Report included the following information SBAR, Kardex, OR Summary, Intake/Output and MAR.

## 2017-08-23 NOTE — PROGRESS NOTES
Problem: Self Care Deficits Care Plan (Adult)  Goal: *Acute Goals and Plan of Care (Insert Text)  Occupational Therapy Goals  Initiated 8/23/2017 within 7 day(s). 1. Patient will perform lower body dressing with supervision/set-up   2. Patient will perform toilet transfers with minimal assistance/contact guard assist.  3. Patient will perform all aspects of toileting with minimal assistance/contact guard assist.  4. Patient will completed standing with minimal assistance/contact guard assist for 5 minutes during ADL to increase activity tolerance for functional activity. 5. Patient will utilize energy conservation techniques during functional activities with verbal cues. OCCUPATIONAL THERAPY EVALUATION     Patient: Letty Reyes (89 y.o. male)  Date: 8/23/2017  Primary Diagnosis: Closed left hip fracture, initial encounter [facility])        Precautions: Fall, TTWB      ASSESSMENT :  Based on the objective data described below, the patient presents with closed hip fracture. Ortho consulted does not feel pt is surgical candidate and pt has been placed on TTWB on Left LE. Pt completed LB dressing with max assist. Sit to stand with max assist x 2 to maintain TTWB status using RW. Pt could benefit from OT to increase I with ADLs, transfers, mobility, activity tolerance and strength for functional activity. Patient will benefit from skilled intervention to address the above impairments.   Patients rehabilitation potential is considered to be Good  Factors which may influence rehabilitation potential include:   [ ]             None noted  [ ]             Mental ability/status  [ ]             Medical condition  [ ]             Home/family situation and support systems  [ ]             Safety awareness  [ ]             Pain tolerance/management  [ ]             Other:        PLAN :  Recommendations and Planned Interventions:  [X]               Self Care Training                  [X]        Therapeutic Activities  [X]               Functional Mobility Training    [ ]        Cognitive Retraining  [X]               Therapeutic Exercises           [X]        Endurance Activities  [X]               Balance Training                   [ ]        Neuromuscular Re-Education  [ ]               Visual/Perceptual Training     [X]   Home Safety Training  [X]               Patient Education                 [X]        Family Training/Education  [ ]               Other (comment):     Frequency/Duration: Patient will be followed by occupational therapy 1-2 times per day/4-7 days per week to address goals. Discharge Recommendations: Rehab  Further Equipment Recommendations for Discharge: N/A       SUBJECTIVE:   Patient stated Ok.       OBJECTIVE DATA SUMMARY:       Past Medical History:   Diagnosis Date    Arthritis       DJD    BPH (benign prostatic hypertrophy)      Cataracts, bilateral      COPD       Dx with mild COPD- Ruled out by [facility])      Ill-defined condition       dermatitis- side of mouth    Lymphedema of lower extremity       right    Mild mental retardation       high functioning     Past Surgical History:   Procedure Laterality Date    HX GI   2005     colonoscopy    HX HERNIA REPAIR         right inguinal as a child    HX LUMBAR FUSION   2008     L2-3    HX LUMBAR FUSION   4-22-13     L2-5    HX MOHS PROCEDURES Left      HX ORTHOPAEDIC         right shoulder and hip    HX ORTHOPAEDIC   2007     right hip pinning    HX ORTHOPAEDIC   2012     right total shoulder    HX PROSTATECTOMY   8-1-13     Greenlight laser    HX UROLOGICAL   8-1-2013     green light laser    SPINE SURGERY PROCEDURE UNLISTED         Barriers to Learning/Limitations: None  Compensate with: visual, verbal, tactile, kinesthetic cues/model  Prior Level of Function/Home Situation: I with ADLs prior to admission  Home Situation  Home Environment: Private residence  # Steps to Enter: 3  One/Two Story Residence: One story  Living Alone: Yes  Support Systems: Family member(s)  Patient Expects to be Discharged to[de-identified] Rehabilitation facility  Current DME Used/Available at Home: [employer]pe Charter, straight, Shower chair  Tub or Shower Type: Tub/Shower combination  [ ]  Right hand dominant           [ ]  Left hand dominant  Cognitive/Behavioral Status:  Neurologic State: Alert  Orientation Level: Oriented X4  Cognition: Follows commands  Safety/Judgement: Awareness of environment; Fall prevention  Skin: intact  Edema: none noted  Vision/Perceptual:    Corrective Lenses: Glasses  Coordination:  Coordination: Within functional limits  Fine Motor Skills-Upper: Left Intact; Right Intact    Gross Motor Skills-Upper: Left Intact; Right Intact  Balance:  Sitting: Intact  Standing: Impaired; With support  Standing - Static: Fair  Standing - Dynamic : Fair  Strength:  Strength: Within functional limits  Tone & Sensation:  Tone: Normal  Sensation: Intact  Range of Motion:  AROM: Within functional limits  Functional Mobility and Transfers for ADLs:  Bed Mobility:  Supine to Sit: Maximum assistance  Sit to Supine: Maximum assistance;Assist x2  Scooting: Maximum assistance  Transfers:  Sit to Stand: Maximum assistance;Assist x2  ADL Assessment:  Upper Body Dressing: Minimum assistance  Lower Body Dressing: Maximum assistance  Toileting: Maximum assistance  ADL Intervention:  Cognitive Retraining  Safety/Judgement: Awareness of environment; Fall prevention     Pain:  Pain Scale 1: Numeric (0 - 10)  Pain Intensity 1: 4  Pain Location 1: Hip  Pain Orientation 1: Left  Pain Intervention(s) 1: Medication (see MAR)  Activity Tolerance:   fair  Please refer to the flowsheet for vital signs taken during this treatment.   After treatment:   [ ] Patient left in no apparent distress sitting up in chair  [X] Patient left in no apparent distress in bed  [X] Call bell left within reach  [X] Nursing notified  [X] Caregiver present  [ ] Bed alarm activated COMMUNICATION/EDUCATION:   [X] Home safety education was provided and the patient/caregiver indicated understanding. [X] Patient/family have participated as able in goal setting and plan of care. [X] Patient/family agree to work toward stated goals and plan of care. [ ] Patient understands intent and goals of therapy, but is neutral about his/her participation. [ ] Patient is unable to participate in goal setting and plan of care.      Thank you for this referral.  Sheyla Cisse OTR/L  Time Calculation: 29 mins

## 2017-08-23 NOTE — H&P
History & Physical    Patient: Bebe Winkler MRN: [id number]  CSN: [id number]    YOB: 1945  Age: 71 y.o. Sex: male      DOA: 8/22/2017  Primary Care Provider:  Micheal Tiwari MD      Assessment/Plan     Patient Active Problem List   Diagnosis Code    Mild mental retardation F70    Fall W19. XXXA    OA (osteoarthritis) M19.90    Spondylolisthesis of lumbar region M43.16    DDD (degenerative disc disease), lumbar M51.36    S/P lumbar spinal fusion Z98.1    Rotator cuff syndrome of left shoulder M75.102    Closed left hip fracture (HCC) S72.002A    Galactosemia (HCC) E74.21       Admit to surgical floor    Left Hip fracture - x-ray showed Nondisplaced, mildly comminuted fracture of the left hip greater trochanter, with slight extension into the proximal intertrochanteric femur. Ortho consulted by ER, management per ortho. CXR and EKG reviewed, patient meets >4 MET and has no complains of chest pain or SOB, he has no history of heart disease. He has history galactosemia. No further tests recommended, he is low risk for surgery. PT/OT per ortho    History of galactosemia - no milk products    DVt prophylaxis per orthopedics        CC: fall       HPI:     Bebe Winkler is a 71 y.o. male who has past history of mild mental retardation is brought to ER with concerns of fall. Patient was moving his yard and he tripped and fell. He complains of pain in his back and left hip. He denies any loss of consciousness, chest pain, SOb, fever/chills. He has multiple surgeries in the past including back and shoulder surgeries. In ER x-ray showed Nondisplaced, mildly comminuted fracture of the left hip greater trochanter,  with slight extension into the proximal intertrochanteric femur.     Past Medical History:   Diagnosis Date    Arthritis     DJD    BPH (benign prostatic hypertrophy)     Cataracts, bilateral     COPD     Dx with mild COPD- Ruled out by Dr. Marion Torres ([facility] [address]     Ill-defined condition     dermatitis- side of mouth    Lymphedema of lower extremity     right    Mild mental retardation     high functioning       Past Surgical History:   Procedure Laterality Date    HX GI  2005    colonoscopy    HX HERNIA REPAIR      right inguinal as a child    HX LUMBAR FUSION  2008    L2-3    HX LUMBAR FUSION  4-22-13    L2-5    HX MOHS PROCEDURES Left     HX ORTHOPAEDIC      right shoulder and hip    HX ORTHOPAEDIC  2007    right hip pinning    HX ORTHOPAEDIC  2012    right total shoulder    HX PROSTATECTOMY  8-1-13    Greenlight laser    HX UROLOGICAL  8-1-2013    green light laser    SPINE SURGERY PROCEDURE UNLISTED         Family History   Problem Relation Age of Onset    Malignant Hyperthermia Neg Hx     Pseudocholinesterase Deficiency Neg Hx     Delayed Awakening Neg Hx     Post-op Nausea/Vomiting Neg Hx     Emergence Delirium Neg Hx     Post-op Cognitive Dysfunction Neg Hx     Cancer Mother      colon cancer       Social History     Social History    Marital status: SINGLE     Spouse name: N/A    Number of children: N/A    Years of education: N/A     Social History Main Topics    Smoking status: Former Smoker     Quit date: 7/25/2008    Smokeless tobacco: Never Used      Comment: Occ. small cigar    Alcohol use 0.5 oz/week     1 Cans of beer per week      Comment: ocassional (Once every 2 weeks).  Drug use: No    Sexual activity: Not on file     Other Topics Concern    Metallic Foreign Body Yes     rt hip and rt arm metal     Social History Narrative       Prior to Admission medications    Medication Sig Start Date End Date Taking? Authorizing Provider   ibuprofen (ADVIL) 200 mg tablet Take 400 mg by mouth two (2) times a day. Yes Historical Provider   ascorbic acid (VITAMIN C) 500 mg tablet Take 1,000 mg by mouth daily. Yes Historical Provider   multivitamins-minerals-lutein (CENTRUM SILVER) Tab Take  by mouth daily.    Yes Historical Provider   desonide (DESOWEN) 0.05 % topical ointment Apply  to affected area as needed for Skin Irritation. Historical Provider   ketoconazole (NIZORAL) 2 % topical cream Apply  to affected area as needed for Skin Irritation. Historical Provider   fexofenadine (ALLEGRA) 180 mg tablet Take 180 mg by mouth daily. Indications: ALLERGIC RHINITIS    Historical Provider   doxycycline (MONODOX) 100 mg capsule Take 100 mg by mouth daily. Takes twice per day when rash get worse. Indications: Skin rash    Historical Provider   Calcium Carbonate-Vit D3-Min (CALTRATE 600+D PLUS MINERALS) 600 mg calcium- 400 unit Tab Take  by mouth daily. Historical Provider   Cholecalciferol, Vitamin D3, (VITAMIN D3) 1,000 unit cap Take  by mouth two (2) times a day. Historical Provider       Allergies   Allergen Reactions    Milk Other (comments)     MILK AND MILK PRODUCTS - SEVERE GALACTOSEMIA       Review of Systems  Gen: No fever, chills, malaise, weight loss/gain. Heent: No headache, rhinorrhea, epistaxis, ear pain, hearing loss, sinus pain, neck pain/stiffness, sore throat. Heart: No chest pain, palpitations, AMES, pnd, or orthopnea. Resp: No cough, hemoptysis, wheezing and shortness of breath. GI: No nausea, vomiting, diarrhea, constipation, melena or hematochezia. : No urinary obstruction, dysuria or hematuria. Derm: No rash, new skin lesion or pruritis. Musc/skeletal: see above  Vasc: No edema, cyanosis or claudication. Endo: No heat/cold intolerance, no polyuria,polydipsia or polyphagia. Neuro: No unilateral weakness, numbness, tingling. No seizures. Heme: No easy bruising or bleeding.           Physical Exam:     Physical Exam:  Visit Vitals    /71 (BP 1 Location: Right arm, BP Patient Position: At rest)    Pulse 72    Temp 99.8 °F (37.7 °C)    Resp 18    Ht 5' 7\" (1.702 m)    Wt 84.8 kg (187 lb)    SpO2 98%    BMI 29.29 kg/m2      O2 Device: Room air    Temp (24hrs), Av.6 °F (37.6 °C), Min:99.3 °F (37.4 °C), Max:99.8 °F (37.7 °C)        08/21 0701 - 08/22 1900  In: -   Out: 9425 [[id number]]    General:  Awake, cooperative, no distress. Head:  Normocephalic, without obvious abnormality, atraumatic. Eyes:  Conjunctivae/corneas clear, sclera anicteric, PERRL, EOMs intact. Nose: Nares normal. No drainage or sinus tenderness. Throat: Lips, mucosa, and tongue normal.    Neck: Supple, symmetrical, trachea midline, no adenopathy. Lungs:   Clear to auscultation bilaterally. Heart:  Regular rate and rhythm, S1, S2 normal, no murmur, click, rub or gallop. Abdomen: Soft, non-tender. Bowel sounds normal. No masses,  No organomegaly. Extremities: Left hip fracture. Pulses: 2+ and symmetric all extremities. Skin: Skin color pink, turgor normal. No rashes or lesions   Neurologic: CNII-XII intact. No focal motor or sensory deficit. Labs Reviewed:    CMP:   Lab Results   Component Value Date/Time     08/22/2017 01:30 PM    K 4.0 08/22/2017 01:30 PM     08/22/2017 01:30 PM    CO2 28 08/22/2017 01:30 PM    AGAP 7 08/22/2017 01:30 PM    GLU 93 08/22/2017 01:30 PM    BUN 27 (H) 08/22/2017 01:30 PM    CREA 0.76 08/22/2017 01:30 PM    GFRAA >60 08/22/2017 01:30 PM    GFRNA >60 08/22/2017 01:30 PM    CA 8.8 08/22/2017 01:30 PM    ALB 3.5 08/22/2017 01:30 PM    TP 7.4 08/22/2017 01:30 PM    GLOB 3.9 08/22/2017 01:30 PM    AGRAT 0.9 08/22/2017 01:30 PM    SGOT 35 08/22/2017 01:30 PM    ALT 33 08/22/2017 01:30 PM     CBC:   Lab Results   Component Value Date/Time    WBC 14.6 (H) 08/22/2017 01:30 PM    HGB 12.6 (L) 08/22/2017 01:30 PM    HCT 38.4 08/22/2017 01:30 PM     08/22/2017 01:30 PM         Procedures/imaging: see electronic medical records for all procedures/Xrays and details which were not copied into this note but were reviewed prior to creation of Plan        CC:  Micheal Tiwari MD

## 2017-08-23 NOTE — PROGRESS NOTES
Problem: Mobility Impaired (Adult and Pediatric)  Goal: *Acute Goals and Plan of Care (Insert Text)  In 1-7 days pt will be able to perform:  ST. Bed mobility: Rolling L to R to L modified independent for positioning. 2. Supine to sit to supine S with HR for meals. 3. Sit to stand to sit S with RW in prep for ambulation. LT. Gait: Ambulate >50ft CGA with RW, TTWB, for home/community mobility. 2. Activity tolerance: Tolerate up in chair 1-2 hours for ADLs. 3. Patient/Family Education: Patient/family to be independent with HEP for follow-up care and safe discharge. PHYSICAL THERAPY EVALUATION     Patient: Nagi Bee (66 y.o. male)  Date: 2017  Primary Diagnosis: Closed left hip fracture, initial encounter [facility])        Precautions:   Fall, TTWB      ASSESSMENT :  Based on the objective data described below, the patient presents with decreased functional mobility and independence in regard to bed mobility, transfers, gt quality and tolerance, generalized strength, pain, stair negotiation and safety. Pt presents w/ L hip fx due to fall. Pt reports pain in L hip w/ mobility. Pt required max A x1-2 for bed mobility and transfers. Pt seen w/ OT. Pt able to follow commands. Pt able to participate in gt training w/ RW, TTWB, GB and max A x1-2 w/ antalgic pattern and need for A to maintain TTWB on L. Pt fatigues quickly w/ activity and will need repetition and reinforcement of techniques and safety. Pt returned to supine in bed w/ all needs within reach and ice pack to L hip. Nurse Marj matos and sisters present. Recommend rehab upon hospital d/c. Pt will need RW for personal use. Patient will benefit from skilled intervention to address the above impairments.   Patients rehabilitation potential is considered to be Good  Factors which may influence rehabilitation potential include:   [ ]         None noted  [X]         Mental ability/status  [ ]         Medical condition  [ ] Home/family situation and support systems  [ ]         Safety awareness  [X]         Pain tolerance/management  [ ]         Other:        PLAN :  Recommendations and Planned Interventions:  [X]           Bed Mobility Training             [ ]    Neuromuscular Re-Education  [X]           Transfer Training                   [ ]    Orthotic/Prosthetic Training  [X]           Gait Training                          [ ]    Modalities  [X]           Therapeutic Exercises          [ ]    Edema Management/Control  [X]           Therapeutic Activities            [X]    Patient and Family Training/Education  [ ]           Other (comment):     Frequency/Duration: Patient will be followed by physical therapy twice daily to address goals. Discharge Recommendations: Rehab  Further Equipment Recommendations for Discharge: rolling walker       SUBJECTIVE:   Patient stated It hurts when I move.       OBJECTIVE DATA SUMMARY:       Past Medical History:   Diagnosis Date    Arthritis       DJD    BPH (benign prostatic hypertrophy)      Cataracts, bilateral      COPD       Dx with mild COPD- Ruled out by Dr. Aric German [facility])      Ill-defined condition       dermatitis- side of mouth    Lymphedema of lower extremity       right    Mild mental retardation       high functioning     Past Surgical History:   Procedure Laterality Date    HX GI   2005     colonoscopy    HX HERNIA REPAIR         right inguinal as a child    HX LUMBAR FUSION   2008     L2-3    HX LUMBAR FUSION   4-22-13     L2-5    HX MOHS PROCEDURES Left      HX ORTHOPAEDIC         right shoulder and hip    HX ORTHOPAEDIC   2007     right hip pinning    HX ORTHOPAEDIC   2012     right total shoulder    HX PROSTATECTOMY   8-1-13     Greenlight laser    HX UROLOGICAL   8-1-2013     green light laser    SPINE SURGERY PROCEDURE UNLISTED         Barriers to Learning/Limitations: yes;  cognitive  Compensate with: visual, verbal, tactile, kinesthetic cues/model  Prior Level of Function/Home Situation:   Home Situation  Home Environment: Private residence  # Steps to Enter: 3  Rails to Enter: No  Wheelchair Ramp: Yes  One/Two Story Residence: One story  Living Alone: Yes  Support Systems: Family member(s)  Patient Expects to be Discharged to[de-identified] Rehabilitation facility  Current DME Used/Available at Home: [address], [address], straight, Shower chair  Tub or Shower Type: Tub/Shower combination  Critical Behavior:  Neurologic State: Alert  Orientation Level: Oriented X4  Cognition: Follows commands  Safety/Judgement: Awareness of environment; Fall prevention  Psychosocial  Patient Behaviors: Calm; Cooperative  Family  Behaviors: Supportive  Skin Condition/Temp: Dry;Warm  Family  Behaviors: Supportive  Skin Integrity: Abrasion (L elbow and knee)  Skin Integumentary  Skin Color: Appropriate for ethnicity  Skin Condition/Temp: Dry;Warm  Skin Integrity: Abrasion (L elbow and knee)  Turgor: Non-tenting  Hair Growth: Present  Varicosities: Absent  Strength:    Strength: Within functional limits  Tone & Sensation:   Tone: Normal  Sensation: Intact  Range Of Motion:  AROM: Generally decreased, functional  Functional Mobility:  Bed Mobility:  Supine to Sit: Maximum assistance;Assist x1;Assist x2; Additional time (vc)  Sit to Supine: Maximum assistance;Assist x2; Additional time (vc)  Scooting: Moderate assistance; Additional time (vc)  Transfers:  Sit to Stand: Maximum assistance;Assist x2; Additional time (vc)  Stand to Sit: Maximum assistance;Assist x2; Additional time (vc)  Balance:   Sitting: Impaired; With support  Sitting - Static: Good (unsupported)  Sitting - Dynamic: Fair (occasional)  Standing: Impaired; With support  Standing - Static: Fair;Constant support  Standing - Dynamic : Poor  Ambulation/Gait Training:  Distance (ft): 3 Feet (ft)  Assistive Device: Walker, rolling;Gait belt  Ambulation - Level of Assistance: Maximum assistance;Assist x1;Assist x2 (vc)  Gait Abnormalities: Antalgic;Decreased step clearance; Step to gait (A to maintain TTWB)  Left Side Weight Bearing: Toe touch  Base of Support: Shift to right  Stance: Time;Left decreased  Speed/Trang: Delayed  Step Length: Left shortened;Right shortened  Swing Pattern: Left asymmetrical;Right asymmetrical  Interventions: Safety awareness training; Tactile cues; Verbal cues  Therapeutic Exercises:   Pain:  Pain Scale 1: Numeric (0 - 10)  Pain Intensity 1: 4  Pain Location 1: Hip  Pain Orientation 1: Left  Pain Intervention(s) 1: Medication (see MAR)  Activity Tolerance:   Fair   Please refer to the flowsheet for vital signs taken during this treatment. After treatment:   [ ]         Patient left in no apparent distress sitting up in chair  [X]         Patient left in no apparent distress in bed  [X]         Call bell left within reach  [X]         Nursing notified  [X]         Caregiver present  [ ]         Bed alarm activated      COMMUNICATION/EDUCATION:   [X]         Fall prevention education was provided and the patient/caregiver indicated understanding. [X]         Patient/family have participated as able in goal setting and plan of care. [X]         Patient/family agree to work toward stated goals and plan of care. [ ]         Patient understands intent and goals of therapy, but is neutral about his/her participation. [ ]         Patient is unable to participate in goal setting and plan of care.      Thank you for this referral.  Warren Lovell, PT   Time Calculation: 35 mins  Eval Complexity: History: HIGH Complexity :3+ comorbidities / personal factors will impact the outcome/ POC Exam:MEDIUM Complexity : 3 Standardized tests and measures addressing body structure, function, activity limitation and / or participation in recreation  Presentation: MEDIUM Complexity : Evolving with changing characteristics  Clinical Decision Making:Medium Complexity amb <30' Overall Complexity:MEDIUM

## 2017-08-23 NOTE — PROGRESS NOTES
2335-Resting in bed, denies pain. Alert and oriented x 4. Stable.  +2, +3 dorsalis pedis pulse, left lower extremity. Scar noted to knee and bruising to left lower leg; positive sensation, movement of toes, and warm. Bowers catheter in place; patent, draining. Denies pain. Call light within reach. 0408-No change from initial assessment. Stable. 0630-Resting quietly in bed. No complaints. Shift Summary:  Uneventful shift. Rested quietly.

## 2017-08-23 NOTE — PROGRESS NOTES
OT eval on hold for now. Awaiting ortho consult from Dr. Fabien Roberts to determine whether pt is surgical candidate or not. Will assess once further clarified.  Thank you Daniel Greer OTR/L

## 2017-08-23 NOTE — PROGRESS NOTES
Readmission Risk Assessment: Low Risk and MSSP/Good Help ACO patients    RRAT Score: 1 - 12    Initial Assessment:  Chart reviewed, met with pt and sisters Cheryl Ceballos and Cecile Henning, who are also guardians. Pt lives alone, nephew lives next door, pt admitted after fall while cutting grass. Pt awaiting evaluation by Ortho for treatment options for left hip. Pt sisters interested in [facility] if pt needs rehab and qualifies, referral will be placed once therapy notes available. Pt has been to [facility] in the past and family pleased. If pt needs SNF level rehab, will need level 2 screening. 1500- referral placed to [facility], pending PT/OT evals, planning for discharge Thursday vs Friday if medically stable. CM following to finalize plan. Emergency Contact: see face sheet    Pertinent Medical Hx: Mild MR, galactosemia, DDD    PCP/Specialists:  Garfield Sprague:     DME: to be determined    Low Risk Care Transition Plan:  1. Evaluate for [facility] or [address] coordination of resources  2. Involve patient/caregiver in assessment, planning, education and implement of intervention. 3. CM daily patient care huddles/interdisciplinary rounds. 4. PCP/Specialist appointment within 7 - 10 days made prior to discharge. 5. Facilitate transportation and logistics for follow-up appointments. 6. Handoff to [address] Nurse Navigator or PCP practice.

## 2017-08-23 NOTE — PROGRESS NOTES
Pharmacy Note:  Medication Reconcilation    Patient's sister and guardian reported to pharmacy patient's current home medications. Centrum Vitamin 1 tablet po daily. Vitamin C 1000 mg po twice daily with meals. Ibuprofen 400 mg po twice daily. PTA med list was updated accordingly.    Hayde Jc, PharmD     [phone]

## 2017-08-23 NOTE — PROGRESS NOTES
Bedside, Verbal and Written shift change report given to Alexandru Merchant RN (oncoming nurse) by Dolores Reid LPN (offgoing nurse). Report included the following information Kardex, Intake/Output, MAR and Recent Results.

## 2017-08-23 NOTE — PROGRESS NOTES
PT orders received and chart reviewed. Per Nurse Kalie Negro pt awaiting to be seen by Dr. Pankaj fish. Will await for further medical ortho assessment prior to PT evaluation. Thank you.

## 2017-08-23 NOTE — PROGRESS NOTES
1800- Received pt alert and oriented X 4 . Mild retardation , just slow to respond to question. Sister is caregiver and present. Lungs clear. BS x 4. Pt states pain in left hip. No signs of bruising or trauma. Awaiting Hospitalist for review and orders. Sontag to room call thibodeaux at side. Amy Faith [phone] arrive to assess pt.

## 2017-08-23 NOTE — PROGRESS NOTES
Hospitalist Progress Note    Patient: Kina Colindres MRN: [id number]  CSN: [id number]    YOB: 1945  Age: 71 y.o. Sex: male    DOA: 8/22/2017 LOS:  LOS: 1 day                Assessment/Plan     Patient Active Problem List   Diagnosis Code    Mild mental retardation F70    Fall W19. XXXA    OA (osteoarthritis) M19.90    Spondylolisthesis of lumbar region M43.16    DDD (degenerative disc disease), lumbar M51.36    S/P lumbar spinal fusion Z98.1    Rotator cuff syndrome of left shoulder M75.102    Closed left hip fracture (HCC) S72.002A    Galactosemia [facility]) E74.21            70 yo male admitted for fall and left hip fracture. Left hip fracture - seen by ortho, further management, PT/OT and DVT prophylaxis per ortho. History of galactosemia - no mild products. Review of systems  General: No fevers or chills. Cardiovascular: No chest pain or pressure. No palpitations. Pulmonary: No shortness of breath. Gastrointestinal: No nausea, vomiting. Physical Exam:  General: Awake, cooperative, no acute distress    HEENT: NC, Atraumatic. PERRLA, anicteric sclerae. Lungs: CTA Bilaterally. No Wheezing/Rhonchi/Rales. Heart:  Regular  rhythm,  No murmur, No Rubs, No Gallops  Abdomen: Soft, Non distended, Non tender.  +Bowel sounds,   Extremities: Pain on ROM left hip. Psych:   Not anxious or agitated. Neurologic:  No acute neurological deficit.                  Vital signs/Intake and Output:  Visit Vitals    /59 (BP 1 Location: Right arm, BP Patient Position: At rest)    Pulse 70    Temp 98.3 °F (36.8 °C)    Resp 18    Ht 5' 7\" (1.702 m)    Wt 84.8 kg (187 lb)    SpO2 99%    BMI 29.29 kg/m2     Current Shift:  08/23 0701 - 08/23 1900  In: 360 [P.O.:360]  Out: 1250 [XUUIT:8195]  Last three shifts:  08/21 1901 - 08/23 0700  In: 637.3 [P.O.:60; I.V.:577.3]  Out: 6092 [Urine:2840]            Labs: Results:       Chemistry Recent Labs      08/23/17   0305  08/22/17 1330   GLU  114*  93   NA  138  140   K  4.0  4.0   CL  104  105   CO2  27  28   BUN  25*  27*   CREA  0.91  0.76   CA  8.9  8.8   AGAP  7  7   BUCR  27*  36*   AP   --   62   TP   --   7.4   ALB   --   3.5   GLOB   --   3.9   AGRAT   --   0.9      CBC w/Diff Recent Labs      08/23/17   0305  08/22/17   1330   WBC  12.7  14.6*   RBC  4.05*  4.20*   HGB  12.2*  12.6*   HCT  37.8  38.4   PLT  195  205   GRANS   --   80*   LYMPH   --   13*   EOS   --   0      Cardiac Enzymes No results for input(s): CPK, CKND1, CORRINE in the last 72 hours. No lab exists for component: CKRMB, TROIP   Coagulation Recent Labs      08/22/17   1330   PTP  13.5   INR  1.1   APTT  25.8       Lipid Panel No results found for: CHOL, CHOLPOCT, CHOLX, CHLST, CHOLV, 729058, HDL, LDL, LDLC, DLDLP, 831182, VLDLC, VLDL, TGLX, TRIGL, TRIGP, TGLPOCT, CHHD, CHHDX   BNP No results for input(s): BNPP in the last 72 hours.    Liver Enzymes Recent Labs      08/22/17   1330   TP  7.4   ALB  3.5   AP  62   SGOT  35      Thyroid Studies No results found for: T4, T3U, TSH, TSHEXT     Procedures/imaging: see electronic medical records for all procedures/Xrays and details which were not copied into this note but were reviewed prior to creation of Plan

## 2017-08-23 NOTE — PROGRESS NOTES
Problem: Falls - Risk of  Goal: *Absence of Falls  Document Rosana Fall Risk and appropriate interventions in the flowsheet.    Outcome: Progressing Towards Goal  Fall Risk Interventions:              Medication Interventions: Patient to call before getting OOB, Teach patient to arise slowly     Elimination Interventions: Call light in reach     History of Falls Interventions: Consult care management for discharge planning

## 2017-08-24 LAB
ANION GAP SERPL CALC-SCNC: 7 MMOL/L (ref 3–18)
BUN SERPL-MCNC: 15 MG/DL (ref 7–18)
BUN/CREAT SERPL: 22 (ref 12–20)
CALCIUM SERPL-MCNC: 8.6 MG/DL (ref 8.5–10.1)
CHLORIDE SERPL-SCNC: 101 MMOL/L (ref 100–108)
CO2 SERPL-SCNC: 25 MMOL/L (ref 21–32)
CREAT SERPL-MCNC: 0.69 MG/DL (ref 0.6–1.3)
ERYTHROCYTE [DISTWIDTH] IN BLOOD BY AUTOMATED COUNT: 12.9 % (ref 11.6–14.5)
GLUCOSE SERPL-MCNC: 129 MG/DL (ref 74–99)
HCT VFR BLD AUTO: 37.3 % (ref 36–48)
HGB BLD-MCNC: 12.3 G/DL (ref 13–16)
MCH RBC QN AUTO: 30.1 PG (ref 24–34)
MCHC RBC AUTO-ENTMCNC: 33 G/DL (ref 31–37)
MCV RBC AUTO: 91.4 FL (ref 74–97)
PLATELET # BLD AUTO: 165 K/UL (ref 135–420)
PMV BLD AUTO: 9.2 FL (ref 9.2–11.8)
POTASSIUM SERPL-SCNC: 3.9 MMOL/L (ref 3.5–5.5)
RBC # BLD AUTO: 4.08 M/UL (ref 4.7–5.5)
SODIUM SERPL-SCNC: 133 MMOL/L (ref 136–145)
WBC # BLD AUTO: 13.2 K/UL (ref 4.6–13.2)

## 2017-08-24 PROCEDURE — 65270000029 HC RM PRIVATE

## 2017-08-24 PROCEDURE — 74011250636 HC RX REV CODE- 250/636: Performed by: INTERNAL MEDICINE

## 2017-08-24 PROCEDURE — 85027 COMPLETE CBC AUTOMATED: CPT | Performed by: HOSPITALIST

## 2017-08-24 PROCEDURE — 97530 THERAPEUTIC ACTIVITIES: CPT

## 2017-08-24 PROCEDURE — 80048 BASIC METABOLIC PNL TOTAL CA: CPT | Performed by: HOSPITALIST

## 2017-08-24 PROCEDURE — 74011250637 HC RX REV CODE- 250/637: Performed by: HOSPITALIST

## 2017-08-24 PROCEDURE — 36415 COLL VENOUS BLD VENIPUNCTURE: CPT | Performed by: HOSPITALIST

## 2017-08-24 RX ORDER — HYDROMORPHONE HYDROCHLORIDE 2 MG/1
2-4 TABLET ORAL
Status: DISCONTINUED | OUTPATIENT
Start: 2017-08-24 | End: 2017-08-25

## 2017-08-24 RX ORDER — ENOXAPARIN SODIUM 100 MG/ML
40 INJECTION SUBCUTANEOUS EVERY 24 HOURS
Status: DISCONTINUED | OUTPATIENT
Start: 2017-08-24 | End: 2017-08-25 | Stop reason: HOSPADM

## 2017-08-24 RX ADMIN — HYDROMORPHONE HYDROCHLORIDE 2 MG: 2 TABLET ORAL at 17:28

## 2017-08-24 RX ADMIN — HYDROMORPHONE HYDROCHLORIDE 2 MG: 2 TABLET ORAL at 10:36

## 2017-08-24 RX ADMIN — ENOXAPARIN SODIUM 40 MG: 40 INJECTION SUBCUTANEOUS at 06:49

## 2017-08-24 RX ADMIN — HYDROMORPHONE HYDROCHLORIDE 1 MG: 1 INJECTION, SOLUTION INTRAMUSCULAR; INTRAVENOUS; SUBCUTANEOUS at 06:48

## 2017-08-24 NOTE — PROGRESS NOTES
Plan: pt has been booked to [facility] [address] [place] [address] for Friday 8/25 admission. Pt will need medical transport for safety. RN please call report [phone] and Please include all hard scripts for controlled substances, med rec and dc summary in packet. Please medicate for pain prior to dc if possible and needed to help offset delay when patient first arrives to facility. Please schedule transport for pt around noon per facility request. Family aware of plan. Care Management Interventions  PCP Verified by CM: Yes  Transition of Care Consult (CM Consult):  Other (Acute rehab)  Physical Therapy Consult: Yes  Occupational Therapy Consult: Yes  Current Support Network: Lives Alone, Family Lives Nearby  Confirm Follow Up Transport: Family  Plan discussed with Pt/Family/Caregiver: Yes  Freedom of Choice Offered: Yes  Discharge Location  Discharge Placement: Rehab Unit Subacute

## 2017-08-24 NOTE — PROGRESS NOTES
Problem: Mobility Impaired (Adult and Pediatric)  Goal: *Acute Goals and Plan of Care (Insert Text)  In 1-7 days pt will be able to perform:  ST. Bed mobility: Rolling L to R to L modified independent for positioning. 2. Supine to sit to supine S with HR for meals. 3. Sit to stand to sit S with RW in prep for ambulation. LT. Gait: Ambulate >50ft CGA with RW, TTWB, for home/community mobility. 2. Activity tolerance: Tolerate up in chair 1-2 hours for ADLs. 3. Patient/Family Education: Patient/family to be independent with HEP for follow-up care and safe discharge. Outcome: Progressing Towards Goal  PHYSICAL THERAPY TREATMENT     Patient: Antionette Cherry (53 y.o. male)  Date: 2017  Diagnosis: Closed left hip fracture, initial encounter [facility]) Closed left hip fracture [facility])  Precautions: Fall, TTWB   Chart, physical therapy assessment, plan of care and goals were reviewed. ASSESSMENT:  Pt c/o 5/10 L hip pain and R knee pain. Pt continues to require Max A x 2 for bed mobility and functional transfers with max verbal cuing for correct UE placement on stable surface. Once standing pt required max verbal and manual cuing to maintain TTWB status with therapist's foot under pt's L LE to limit his WB through that LE. Pt only able to tolerate standing for <20 seconds due to increasing pain and decreased tolerance to activity. Left pt in bed with all needs met, SCDs applied, ice pack to L hip and R knee and call bell within reach. Recommend rehab at time of discharge. Nette Sharma RN notified of pt's increased pain level and progress with PT. Progression toward goals:  [ ]      Improving appropriately and progressing toward goals  [X]      Improving slowly and progressing toward goals  [ ]      Not making progress toward goals and plan of care will be adjusted       PLAN:  Patient continues to benefit from skilled intervention to address the above impairments.   Continue treatment per established plan of care. Discharge Recommendations:  Rehab  Further Equipment Recommendations for Discharge:  rolling walker       SUBJECTIVE:   Patient stated It hurts so bad and now my right knee is hurting too.       OBJECTIVE DATA SUMMARY:   Critical Behavior:  Neurologic State: Alert  Orientation Level: Oriented X4  Cognition: Follows commands, Appropriate decision making  Safety/Judgement: Fall prevention, Insight into deficits  Functional Mobility Training:  Bed Mobility:  Rolling: Moderate assistance  Supine to Sit: Maximum assistance; Additional time;Assist x2  Sit to Supine: Maximum assistance; Total assistance;Assist x2  Scooting: Maximum assistance;Assist x2; Additional time   Transfers:  Sit to Stand: Minimum assistance;Assist x2; Additional time  Stand to Sit: Maximum assistance;Assist x2; Additional time  Balance:  Sitting: Impaired; With support  Sitting - Static: Good (unsupported)  Sitting - Dynamic: Fair (occasional)  Standing: Impaired;Pull to stand; With support  Standing - Static: Fair  Standing - Dynamic : Poor  Ambulation/Gait Training:   Left Side Weight Bearing: Toe touch   Therapeutic Exercises:   HEP included ankle pumps and heel slides x 5 reps  Pain:  Pain Scale 1: Numeric (0 - 10)  Pain Intensity 1: 5  Pain Location 1: Hip;Knee (L hip, R knee)  Pain Orientation 1: Left;Right  Pain Description 1: Aching  Pain Intervention(s) 1: Cold pack;Nurse notified;Rest;Repositioned  Activity Tolerance:   Fair-/Poor  Please refer to the flowsheet for vital signs taken during this treatment.   After treatment:   [ ] Patient left in no apparent distress sitting up in chair  [X] Patient left in no apparent distress in bed  [X] Call bell left within reach  [X] Nursing notified  [X] Caregiver present  [ ] Bed alarm activated        Shazia Gibbs PT, DPT      Time Calculation: 24 mins

## 2017-08-24 NOTE — PROGRESS NOTES
20:15 Assessment completed. Lungs are clear bilat. Ice pack was refilled & re-applied. Abrasions on L forearm & LLE  remains scabbed & open to the air. Resting quietly in bed with TV on.    23:00 Shift assessment completed. See nsg flow sheet for details. 03:00Reassessed with 0 changes noted. Resting quietly in bed between cares. Ice pack refilled & re-applied. 07:40 Bedside and Verbal shift change report given to Darcie Garner RN (oncoming nurse) by Dorinda Whaley RN (offgoing nurse). Report included the following information SBAR.

## 2017-08-24 NOTE — PROGRESS NOTES
Problem: Mobility Impaired (Adult and Pediatric)  Goal: *Acute Goals and Plan of Care (Insert Text)  In 1-7 days pt will be able to perform:  ST. Bed mobility: Rolling L to R to L modified independent for positioning. 2. Supine to sit to supine S with HR for meals. 3. Sit to stand to sit S with RW in prep for ambulation. LT. Gait: Ambulate >50ft CGA with RW, TTWB, for home/community mobility. 2. Activity tolerance: Tolerate up in chair 1-2 hours for ADLs. 3. Patient/Family Education: Patient/family to be independent with HEP for follow-up care and safe discharge. Outcome: Progressing Towards Goal  PHYSICAL THERAPY TREATMENT     Patient: Veta Councilman (59 y.o. male)  Date: 2017  Diagnosis: Closed left hip fracture, initial encounter [facility]) Closed left hip fracture [facility])  Precautions: Fall, TTWB   Chart, physical therapy assessment, plan of care and goals were reviewed. ASSESSMENT:  Pt seems to be in more pain, especially to R knee. Pt continues to require significant assistance with all aspects of mobility. Pt unable to maintain TTWB to LLE. Pt also presents with decrease tolerance to activity and fatigues quickly. Cont POC. Progression toward goals:  [ ]      Improving appropriately and progressing toward goals  [X]      Improving slowly and progressing toward goals  [ ]      Not making progress toward goals and plan of care will be adjusted       PLAN:  Patient continues to benefit from skilled intervention to address the above impairments. Continue treatment per established plan of care.   Discharge Recommendations:  Rehab  Further Equipment Recommendations for Discharge:  rolling walker       SUBJECTIVE:   Patient stated  I'll try       OBJECTIVE DATA SUMMARY:   Critical Behavior:  Neurologic State: Alert  Orientation Level: Oriented X4  Cognition: Follows commands, Appropriate decision making  Safety/Judgement: Fall prevention, Insight into deficits  Functional Mobility Training:  Bed Mobility:  Rolling: Moderate assistance  Supine to Sit: Maximum assistance;Assist x2  Sit to Supine: Maximum assistance;Assist x2  Scooting: Maximum assistance  Transfers:  Sit to Stand: Maximum assistance;Assist x2  Stand to Sit: Maximum assistance;Assist x2  Balance:  Sitting: Impaired; With support  Sitting - Static: Fair (occasional)  Sitting - Dynamic: Fair (occasional)  Standing: Impaired; With support  Standing - Static: Fair  Standing - Dynamic : Poor  Ambulation/Gait Training:  Left Side Weight Bearing:  Toe touch     Pain:  Pain Scale 1: Numeric (0 - 10)  Pain Intensity 1: 5  Pain Location 1: Hip;Knee (L hip, R knee)  Pain Orientation 1: Left;Right  Pain Description 1: Aching  Pain Intervention(s) 1: Cold pack;Nurse notified;Rest;Repositioned  Activity Tolerance:   Poor      After treatment:   [ ] Patient left in no apparent distress sitting up in chair  [X] Patient left in no apparent distress in bed  [X] Call bell left within reach  [ ] Nursing notified  [X] Caregiver present  [ ] Bed alarm activated      Sue Ledezma PTA   Time Calculation: 27 mins

## 2017-08-24 NOTE — PROGRESS NOTES
19:45 Assessment completed. Lungs are clear bilat. Scabbed areas on L forearm/elbow & LLE remain D/I . Ice pack refilled & re-applied to L hip . Resting quietly in bed with visitor @ bedside. Voiding per urinal w/o difficulty. 23:55 Shift assessment completed. See ns flow sheet for details. 03:00 Reassessed with 0 changes noted. Resting quietly in bed with eyes closed between cares x for voiding per urinal w/o difficulty. Mara Avalos 07:20 Bedside and Verbal shift change report given to Mikael Conrad RN (oncoming nurse) by Rios Jimenez RN (offgoing nurse). Report included the following information SBAR.

## 2017-08-24 NOTE — PROGRESS NOTES
6602 - Patient in bed eating breakfast. A/O x 4. Lungs CTA. Bowel sounds active x 4. Pedal pulses palpable. Abrasion noted to left forearm and LLE. IV to left AC intact and patent. Pain 3/10.    1032 - Pain 5/10. PRN percocet pain medication administered at this time. Patient has been educated on side effects. Side effect education sheets have been provided. 1150 - Report called to [facility]. Spoke with Ca Bach RN.     [address] transport in to  patient.

## 2017-08-24 NOTE — PROGRESS NOTES
Problem: Falls - Risk of  Goal: *Absence of Falls  Document Rosana Fall Risk and appropriate interventions in the flowsheet.    Outcome: Progressing Towards Goal  Fall Risk Interventions:  Mobility Interventions: Patient to call before getting OOB           Medication Interventions: Patient to call before getting OOB     Elimination Interventions: Call light in reach     History of Falls Interventions: Door open when patient unattended

## 2017-08-24 NOTE — PROGRESS NOTES
Hospitalist Progress Note    Patient: Issa Oleary MRN: [id number]  CSN: [id number]    YOB: 1945  Age: 71 y.o. Sex: male    DOA: 8/22/2017 LOS:  LOS: 2 days                Assessment/Plan     Patient Active Problem List   Diagnosis Code    Mild mental retardation F70    Fall W19. XXXA    OA (osteoarthritis) M19.90    Spondylolisthesis of lumbar region M43.16    DDD (degenerative disc disease), lumbar M51.36    S/P lumbar spinal fusion Z98.1    Rotator cuff syndrome of left shoulder M75.102    Closed left hip fracture (HCC) S72.002A    Galactosemia [facility]) E74.21            72 yo male admitted for fall and left hip fracture. Left hip fracture - seen by ortho, no surgical intervention per ortho, PT/OT and DVT prophylaxis per ortho. History of galactosemia - no mild products. Discharge planning to rehab. Review of systems  General: No fevers or chills. Cardiovascular: No chest pain or pressure. No palpitations. Pulmonary: No shortness of breath. Gastrointestinal: No nausea, vomiting. Physical Exam:  General: Awake, cooperative, no acute distress    HEENT: NC, Atraumatic. PERRLA, anicteric sclerae. Lungs: CTA Bilaterally. No Wheezing/Rhonchi/Rales. Heart:  Regular  rhythm,  No murmur, No Rubs, No Gallops  Abdomen: Soft, Non distended, Non tender.  +Bowel sounds,   Extremities: Pain on ROM left hip. Psych:   Not anxious or agitated. Neurologic:  No acute neurological deficit.                  Vital signs/Intake and Output:  Visit Vitals    /60 (BP 1 Location: Right arm, BP Patient Position: At rest)    Pulse 77    Temp 99.8 °F (37.7 °C)    Resp 18    Ht 5' 7\" (1.702 m)    Wt 84.8 kg (187 lb)    SpO2 95%    BMI 29.29 kg/m2     Current Shift:  08/24 0701 - 08/24 1900  In: 437 [P.O.:775]  Out: 450 [Urine:450]  Last three shifts:  08/22 1901 - 08/24 0700  In: 3069.9 [P.O.:660; I.V.:2409.9]  Out: 4325 [Urine:4325]            Labs: Results: Chemistry Recent Labs      08/24/17   0409  08/23/17   0305  08/22/17   1330   GLU  129*  114*  93   NA  133*  138  140   K  3.9  4.0  4.0   CL  101  104  105   CO2  25  27  28   BUN  15  25*  27*   CREA  0.69  0.91  0.76   CA  8.6  8.9  8.8   AGAP  7  7  7   BUCR  22*  27*  36*   AP   --    --   62   TP   --    --   7.4   ALB   --    --   3.5   GLOB   --    --   3.9   AGRAT   --    --   0.9      CBC w/Diff Recent Labs      08/24/17   0409  08/23/17   0305  08/22/17   1330   WBC  13.2  12.7  14.6*   RBC  4.08*  4.05*  4.20*   HGB  12.3*  12.2*  12.6*   HCT  37.3  37.8  38.4   PLT  165  195  205   GRANS   --    --   80*   LYMPH   --    --   13*   EOS   --    --   0      Cardiac Enzymes No results for input(s): CPK, CKND1, CORRINE in the last 72 hours. No lab exists for component: CKRMB, TROIP   Coagulation Recent Labs      08/22/17   1330   PTP  13.5   INR  1.1   APTT  25.8       Lipid Panel No results found for: CHOL, CHOLPOCT, CHOLX, CHLST, CHOLV, 920523, HDL, LDL, LDLC, DLDLP, 639643, VLDLC, VLDL, TGLX, TRIGL, TRIGP, TGLPOCT, CHHD, CHHDX   BNP No results for input(s): BNPP in the last 72 hours.    Liver Enzymes Recent Labs      08/22/17   1330   TP  7.4   ALB  3.5   AP  62   SGOT  35      Thyroid Studies No results found for: T4, T3U, TSH, TSHEXT, TSHEXT     Procedures/imaging: see electronic medical records for all procedures/Xrays and details which were not copied into this note but were reviewed prior to creation of Plan

## 2017-08-24 NOTE — PROGRESS NOTES
Problem: Falls - Risk of  Goal: *Absence of Falls  Document Rosana Fall Risk and appropriate interventions in the flowsheet.    Outcome: Progressing Towards Goal  Fall Risk Interventions:  Mobility Interventions: Patient to call before getting OOB, Utilize walker, cane, or other assitive device           Medication Interventions: Assess postural VS orthostatic hypotension, Patient to call before getting OOB, Teach patient to arise slowly     Elimination Interventions: Call light in reach, Patient to call for help with toileting needs     History of Falls Interventions: Door open when patient unattended

## 2017-08-24 NOTE — PROGRESS NOTES
conducted a visit with Dena Zabala, who is a 71 y.o.,male. The  provided the following Interventions:  Initiated a relationship of care and support. Offered prayer and assurance of continued prayers on patient's behalf. Plan:  Chaplains will continue to follow and will provide pastoral care on an as needed/requested basis.  recommends bedside caregivers page  on duty if patient shows signs of acute spiritual or emotional distress. Rev. MARIE Frazier.Div. Spiritual Care Dept.   [phone]

## 2017-08-25 ENCOUNTER — APPOINTMENT (OUTPATIENT)
Dept: GENERAL RADIOLOGY | Age: 72
DRG: 536 | End: 2017-08-25
Attending: HOSPITALIST
Payer: MEDICARE

## 2017-08-25 VITALS
OXYGEN SATURATION: 91 % | DIASTOLIC BLOOD PRESSURE: 69 MMHG | HEIGHT: 67 IN | RESPIRATION RATE: 18 BRPM | WEIGHT: 187 LBS | SYSTOLIC BLOOD PRESSURE: 137 MMHG | HEART RATE: 84 BPM | TEMPERATURE: 99 F | BODY MASS INDEX: 29.35 KG/M2

## 2017-08-25 LAB
ANION GAP SERPL CALC-SCNC: 10 MMOL/L (ref 3–18)
BUN SERPL-MCNC: 15 MG/DL (ref 7–18)
BUN/CREAT SERPL: 19 (ref 12–20)
CALCIUM SERPL-MCNC: 8.4 MG/DL (ref 8.5–10.1)
CHLORIDE SERPL-SCNC: 99 MMOL/L (ref 100–108)
CO2 SERPL-SCNC: 26 MMOL/L (ref 21–32)
CREAT SERPL-MCNC: 0.79 MG/DL (ref 0.6–1.3)
ERYTHROCYTE [DISTWIDTH] IN BLOOD BY AUTOMATED COUNT: 12.9 % (ref 11.6–14.5)
GLUCOSE SERPL-MCNC: 125 MG/DL (ref 74–99)
HCT VFR BLD AUTO: 38.2 % (ref 36–48)
HGB BLD-MCNC: 12.9 G/DL (ref 13–16)
MCH RBC QN AUTO: 30.5 PG (ref 24–34)
MCHC RBC AUTO-ENTMCNC: 33.8 G/DL (ref 31–37)
MCV RBC AUTO: 90.3 FL (ref 74–97)
PLATELET # BLD AUTO: 138 K/UL (ref 135–420)
PMV BLD AUTO: 9 FL (ref 9.2–11.8)
POTASSIUM SERPL-SCNC: 4.2 MMOL/L (ref 3.5–5.5)
RBC # BLD AUTO: 4.23 M/UL (ref 4.7–5.5)
SODIUM SERPL-SCNC: 135 MMOL/L (ref 136–145)
WBC # BLD AUTO: 14.2 K/UL (ref 4.6–13.2)

## 2017-08-25 PROCEDURE — 80048 BASIC METABOLIC PNL TOTAL CA: CPT | Performed by: HOSPITALIST

## 2017-08-25 PROCEDURE — 74011250637 HC RX REV CODE- 250/637: Performed by: HOSPITALIST

## 2017-08-25 PROCEDURE — 73560 X-RAY EXAM OF KNEE 1 OR 2: CPT

## 2017-08-25 PROCEDURE — 97530 THERAPEUTIC ACTIVITIES: CPT

## 2017-08-25 PROCEDURE — 36415 COLL VENOUS BLD VENIPUNCTURE: CPT | Performed by: HOSPITALIST

## 2017-08-25 PROCEDURE — 85027 COMPLETE CBC AUTOMATED: CPT | Performed by: HOSPITALIST

## 2017-08-25 PROCEDURE — 74011250636 HC RX REV CODE- 250/636: Performed by: INTERNAL MEDICINE

## 2017-08-25 RX ORDER — OXYCODONE AND ACETAMINOPHEN 10; 325 MG/1; MG/1
1 TABLET ORAL
Status: DISCONTINUED | OUTPATIENT
Start: 2017-08-25 | End: 2017-08-25 | Stop reason: HOSPADM

## 2017-08-25 RX ORDER — OXYCODONE AND ACETAMINOPHEN 10; 325 MG/1; MG/1
1 TABLET ORAL
Qty: 15 TAB | Refills: 0 | Status: SHIPPED | OUTPATIENT
Start: 2017-08-25 | End: 2017-12-11

## 2017-08-25 RX ORDER — ENOXAPARIN SODIUM 100 MG/ML
40 INJECTION SUBCUTANEOUS EVERY 24 HOURS
Qty: 10 SYRINGE | Refills: 0 | Status: SHIPPED | OUTPATIENT
Start: 2017-08-25 | End: 2017-12-11

## 2017-08-25 RX ADMIN — HYDROMORPHONE HYDROCHLORIDE 2 MG: 2 TABLET ORAL at 04:19

## 2017-08-25 RX ADMIN — OXYCODONE HYDROCHLORIDE AND ACETAMINOPHEN 1 TABLET: 10; 325 TABLET ORAL at 10:32

## 2017-08-25 RX ADMIN — ENOXAPARIN SODIUM 40 MG: 40 INJECTION SUBCUTANEOUS at 06:27

## 2017-08-25 NOTE — DISCHARGE SUMMARY
Discharge Summary    Patient: Yan Campbell MRN: [id number]  CSN: [id number]    YOB: 1945  Age: 71 y.o. Sex: male    DOA: 8/22/2017 LOS:  LOS: 3 days   Discharge Date:      Primary Care Provider:  Brent Pettit MD    Admission Diagnoses: Closed left hip fracture, initial encounter [facility])    Discharge Diagnoses:    Problem List as of 8/25/2017  Date Reviewed: 1/23/2015          Codes Class Noted - Resolved    * (Principal)Closed left hip fracture ([facility] [address] ICD-10-CM: U21.632M  ICD-9-CM: 820.8  8/22/2017 - Present        Galactosemia ([facility] [address] ICD-10-CM: F54.70  ICD-9-CM: 271.1  Unknown - Present        Rotator cuff syndrome of left shoulder (Chronic) ICD-10-CM: M75.102  ICD-9-CM: 726.10  9/8/2013 - Present        Spondylolisthesis of lumbar region ICD-10-CM: M43.16  ICD-9-CM: 738.4  4/15/2013 - Present        DDD (degenerative disc disease), lumbar ICD-10-CM: M51.36  ICD-9-CM: 722.52  4/15/2013 - Present        S/P lumbar spinal fusion ICD-10-CM: Z98.1  ICD-9-CM: V45.4  4/15/2013 - Present        Mild mental retardation ICD-10-CM: F70  ICD-9-CM: 666  Unknown - Present        Fall ICD-10-CM: W19. Shirley Hongm  ICD-9-CM: E888.9  7/25/2012 - Present        OA (osteoarthritis) ICD-10-CM: M19.90  ICD-9-CM: 715.90  7/25/2012 - Present    Overview Signed 7/25/2012 12:22 PM by Steffen Gavin MD     HIPS             RESOLVED: Spinal stenosis, lumbar ICD-10-CM: M48.06  ICD-9-CM: 724.02  4/15/2013 - 4/24/2013              Discharge Medications:     Current Discharge Medication List      START taking these medications    Details   enoxaparin (LOVENOX) 40 mg/0.4 mL 0.4 mL by SubCUTAneous route every twenty-four (24) hours. Indications: DEEP VEIN THROMBOSIS PREVENTION  Qty: 10 Syringe, Refills: 0      oxyCODONE-acetaminophen (PERCOCET 10)  mg per tablet Take 1 Tab by mouth every six (6) hours as needed. Max Daily Amount: 4 Tabs.   Qty: 15 Tab, Refills: 0         CONTINUE these medications which have NOT CHANGED Details   ascorbic acid, vitamin C, (VITAMIN C) 1,000 mg tablet Take 1,000 mg by mouth two (2) times daily (with meals). multivitamins-minerals-lutein (CENTRUM SILVER) Tab Take 1 Tab by mouth daily. STOP taking these medications       ibuprofen (ADVIL) 200 mg tablet Comments:   Reason for Stopping:               Discharge Condition: Good      Consults: Orthopedics      PHYSICAL EXAM   Visit Vitals    /69    Pulse 84    Temp 99 °F (37.2 °C)    Resp 18    Ht 5' 7\" (1.702 m)    Wt 84.8 kg (187 lb)    SpO2 91%    BMI 29.29 kg/m2     General: Awake, cooperative, no acute distress    HEENT: NC, Atraumatic. PERRLA, EOMI. Anicteric sclerae. Lungs:  CTA Bilaterally. No Wheezing/Rhonchi/Rales. Heart:  Regular  rhythm,  No murmur, No Rubs, No Gallops  Abdomen: Soft, Non distended, Non tender. +Bowel sounds,   Extremities: Pain on ROM left hip, pain on ROM right knee, right knee mild swelling, no redness. Psych:   Not anxious or agitated. Neurologic:  No acute neurological deficits. Admission HPI : Thierno Malin is a 71 y.o. male who has past history of mild mental retardation is brought to ER with concerns of fall. Patient was moving his yard and he tripped and fell. He complains of pain in his back and left hip. He denies any loss of consciousness, chest pain, SOB, fever/chills. He has multiple surgeries in the past including back and shoulder surgeries. In ER x-ray showed Nondisplaced, mildly comminuted fracture of the left hip greater trochanter,  with slight extension into the proximal intertrochanteric femur.       Hospital Course :   Left Hip fracture - x-ray showed Nondisplaced, mildly comminuted fracture of the left hip greater trochanter, with slight extension into the proximal intertrochanteric femur.  Ortho consulted, CT left hip showed Comminuted minimally displaced fracture of the left greater trochanter without dislocation of the left femoral head. No surgery recommended, patient is to be toe touch weight baring LLE. He worked with PT/OT and rehab recommended. He also complained of right knee pain. X-ray did not show any fracture, showed mild effusion. Activity: Activity as tolerated    Diet: Regular Diet, no milk products    Follow-up: PCP, orthopedics    Disposition: rehab    Minutes spent on discharge: 45       Labs: Results:       Chemistry Recent Labs      08/25/17   0631  08/24/17   0409  08/23/17   0305 08/22/17   1330   GLU  125*  129*  114*  93   NA  135*  133*  138  140   K  4.2  3.9  4.0  4.0   CL  99*  101  104  105   CO2  26  25  27  28   BUN  15  15  25*  27*   CREA  0.79  0.69  0.91  0.76   CA  8.4*  8.6  8.9  8.8   AGAP  10  7  7  7   BUCR  19  22*  27*  36*   AP   --    --    --   62   TP   --    --    --   7.4   ALB   --    --    --   3.5   GLOB   --    --    --   3.9   AGRAT   --    --    --   0.9      CBC w/Diff Recent Labs      08/25/17   0631  08/24/17   0409 08/23/17   0305 08/22/17   1330   WBC  14.2*  13.2  12.7  14.6*   RBC  4.23*  4.08*  4.05*  4.20*   HGB  12.9*  12.3*  12.2*  12.6*   HCT  38.2  37.3  37.8  38.4   PLT  138  165  195  205   GRANS   --    --    --   80*   LYMPH   --    --    --   13*   EOS   --    --    --   0      Cardiac Enzymes No results for input(s): CPK, CKND1, CORRINE in the last 72 hours. No lab exists for component: CKRMB, TROIP   Coagulation Recent Labs      08/22/17   1330   PTP  13.5   INR  1.1   APTT  25.8       Lipid Panel No results found for: CHOL, CHOLPOCT, CHOLX, CHLST, CHOLV, 986833, HDL, LDL, LDLC, DLDLP, 320435, VLDLC, VLDL, TGLX, TRIGL, TRIGP, TGLPOCT, CHHD, CHHDX   BNP No results for input(s): BNPP in the last 72 hours.    Liver Enzymes Recent Labs      08/22/17   1330   TP  7.4   ALB  3.5   AP  62   SGOT  35      Thyroid Studies No results found for: T4, T3U, TSH, TSHEXT         Significant Diagnostic Studies: Xr Spine Lumb Min 4 V    Result Date: 8/22/2017  EXAM:  XR SPINE LUMB MIN 4 V INDICATION:   Fall, back pain. COMPARISON: July 22, 2012 FINDINGS: AP, lateral, bilateral oblique, and spot lateral views of the lumbar spine demonstrate postsurgical changes of posterior instrumented spinal fusion procedure extending from L2-L5, with paired pedicular screws connected by vertically oriented rods. Discectomy and intervertebral cage placement at L4-L5 noted. There is lucency surrounding each of the pedicle screws, most conspicuously the L2 pedicular screws. There is slight anterolisthesis of L4 on L5. Vertebral body heights are maintained, without acute compressive height loss. Diffuse atherosclerotic calcification of the abdominal aorta noted. IMPRESSION:  1. Extension of posterior instrumented spinal fusion in the interval from prior lumbar spine radiographs of July 22, 2012. Lucency is noted surrounding all paired pedicular screws, greatest at L2. Comparison with more recent prior imaging of the lumbar spine is recommended. If no more recent comparison imaging is available, further evaluation with nonemergent flexion and extension projections may be helpful for evaluation of potential instability. 2. No acute radiographic abnormality involving the lumbar spine. 3. Trace anterolisthesis L4 on L5. Xr Hip Lt W Or Wo Pelv 2-3 Vws    Result Date: 8/22/2017  Examination: Left hip one or 2 views. HISTORY: Fall while cutting grass, left hip pain. FINDINGS: AP projection of the pelvis as well as a frog-leg lateral view are compared to prior examination July 22, 2012. There is a nondisplaced mildly comminuted fracture of the left hip greater trochanter. Slight extension into the proximal portion of the intertrochanteric left femur is noted. Minimal left hip joint space narrowing noted. Note is additionally made of a healed, reduced, and nailed proximal right femoral fracture. Mild right hip joint space narrowing demonstrated. Partial visualization of lower lumbar surgical instrumentation. Bony pelvis is intact. IMPRESSION: 1. Nondisplaced, mildly comminuted fracture of the left hip greater trochanter, with slight extension into the proximal intertrochanteric femur. 2. Healed, reduced, and nailed proximal right femoral fracture. Xr Knee Rt Max 2 Vws    Result Date: 8/25/2017  EXAM:  XR KNEE RT MAX 2 VWS INDICATION:   knee pain, fall COMPARISON: None. FINDINGS: Frontal and lateral of the right knee demonstrate no fracture or dislocation. Osteoarthritic changes at the knee with joint space narrowing and marginal osteophytes are noted. There is a small to moderate suprapatellar knee joint effusion. IMPRESSION: No fracture, small to moderate knee joint effusion. Ct Hip Lt Wo Cont    Result Date: 8/22/2017  EXAM: CT of the left hip without contrast INDICATION: Fracture follow-up left hip COMPARISON: X-rays left hip dated same date TECHNIQUE: Axial CT imaging of the left hip was performed without intravenous contrast. Multiplanar reformats were generated. DOSE REDUCTION:  One or more dose reduction techniques were used on this CT: automated exposure control, adjustment of the mAs and/or kVp according to patient's size, and iterative reconstruction techniques. The specific techniques utilized on this CT exam have been documented in the patient's electronic medical record. _______________ FINDINGS: BONES: There is a comminuted minimally displaced fracture of the greater trochanter. Femoral head is seated in the acetabulum. Pubic symphysis is intact. OTHER: Soft tissue windows demonstrate Bowers catheter in the bladder. No pelvic adenopathy seen on the left side. There is mild stranding in the subcutaneous tissues of the left hip. _______________     IMPRESSION: 1. Comminuted minimally displaced fracture of the left greater trochanter without dislocation of the left femoral head. Xr Chest Port    Result Date: 8/22/2017  CHEST AP PORTABLE Indication: Hip fracture. Comparison: 07/22/2012. Findings: Low lung volume with stable streaky parenchymal opacity suspicious for scarring. The lungs appear clear of infiltrates. No evidence for pneumothorax or pleural effusion. Cardiac silhouette and pulmonary vascularity appear within normal limits. Reverse right shoulder arthroplasty partially visualized. IMPRESSION: No acute cardiopulmonary disease or significant change. Mild parenchymal scarring. No results found for this or any previous visit.         CC: Lurdes Cox MD

## 2017-08-25 NOTE — ROUTINE PROCESS
TRANSFER - OUT REPORT:    Verbal report given to Ellen Escobar RN(name) on Kina Colindres  being transferred to [facility]) for routine progression of care       Report consisted of patients Situation, Background, Assessment and   Recommendations(SBAR). Information from the following report(s) SBAR, ED Summary, OR Summary, Intake/Output and MAR was reviewed with the receiving nurse. Opportunity for questions and clarification was provided. Patient transported with:  Discharge instructions and hard script for Percocet 10/325 mg.

## 2017-08-25 NOTE — PROGRESS NOTES
Problem: Falls - Risk of  Goal: *Absence of Falls  Document Rosana Fall Risk and appropriate interventions in the flowsheet.    Outcome: Progressing Towards Goal  Fall Risk Interventions:  Mobility Interventions: Patient to call before getting OOB, Utilize walker, cane, or other assitive device           Medication Interventions: Assess postural VS orthostatic hypotension, Patient to call before getting OOB, Teach patient to arise slowly     Elimination Interventions: Call light in reach, Patient to call for help with toileting needs, Urinal in reach     History of Falls Interventions: Door open when patient unattended

## 2017-08-25 NOTE — PROGRESS NOTES
Problem: Self Care Deficits Care Plan (Adult)  Goal: *Acute Goals and Plan of Care (Insert Text)  Occupational Therapy Goals  Initiated 8/23/2017 within 7 day(s). 1. Patient will perform lower body dressing with supervision/set-up   2. Patient will perform toilet transfers with minimal assistance/contact guard assist.  3. Patient will perform all aspects of toileting with minimal assistance/contact guard assist.  4. Patient will completed standing with minimal assistance/contact guard assist for 5 minutes during ADL to increase activity tolerance for functional activity. 5. Patient will utilize energy conservation techniques during functional activities with verbal cues. OCCUPATIONAL THERAPY TREATMENT     Patient: Rita Michele (35 y.o. male)  Date: 8/25/2017  Diagnosis: Closed left hip fracture, initial encounter [facility]) Closed left hip fracture [facility])       Precautions: Fall, TTWB  Chart, occupational therapy assessment, plan of care, and goals were reviewed. ASSESSMENT:  Pt completed supine to sit EOB with max assist x 2. Pt continues to be limited by pain. Today pt is reporting pain in right knee as well as left hip from weightbearing through right LE to maintain TTWB on left LE. Pt max assist x 2 for sit to stand using RW and assist of 2nd person to maintain TTWB status. Pt total assist for toileting hygiene standing at bedside. Continue addressing OT goals for therapy. Progression toward goals:  [ ]          Improving appropriately and progressing toward goals  [X]          Improving slowly and progressing toward goals  [ ]          Not making progress toward goals and plan of care will be adjusted       PLAN:  Patient continues to benefit from skilled intervention to address the above impairments. Continue treatment per established plan of care. Discharge Recommendations:  Rehab  Further Equipment Recommendations for Discharge:  N/A       SUBJECTIVE:   Patient stated I'm going to rehab.  OBJECTIVE DATA SUMMARY:   Cognitive/Behavioral Status:  Neurologic State: Alert  Orientation Level: Oriented X4  Cognition: Appropriate decision making, Follows commands  Safety/Judgement: Awareness of environment, Fall prevention  Functional Mobility and Transfers for ADLs:              Bed Mobility:  Supine to Sit: Maximum assistance;Assist x2  Scooting: Maximum assistance              Transfers:  Sit to Stand: Maximum assistance;Assist x2  Balance:  Sitting: Intact  Standing: Impaired; With support  Standing - Static: Fair  ADL Intervention:  Cognitive Retraining  Safety/Judgement: Awareness of environment; Fall prevention     Pain:  Pain Scale 1: Numeric (0 - 10)  Pain Intensity 1: 2  Pain Location 1: Hip;Knee  Pain Orientation 1: Left;Right (left hip, right knee)  Pain Description 1: Aching  Pain Intervention(s) 1: Medication (see MAR)     Activity Tolerance:    Fair -  Please refer to the flowsheet for vital signs taken during this treatment.   After treatment:   [ ]  Patient left in no apparent distress sitting up in chair  [X]  Patient left in no apparent distress in bed  [X]  Call bell left within reach  [X]  Nursing notified  [X]  Caregiver present  [ ]  Bed alarm activated     JE Bellamy/L  Time Calculation: 18 mins

## 2017-08-25 NOTE — PROGRESS NOTES
Problem: Mobility Impaired (Adult and Pediatric)  Goal: *Acute Goals and Plan of Care (Insert Text)  In 1-7 days pt will be able to perform:  ST. Bed mobility: Rolling L to R to L modified independent for positioning. 2. Supine to sit to supine S with HR for meals. 3. Sit to stand to sit S with RW in prep for ambulation. LT. Gait: Ambulate >50ft CGA with RW, TTWB, for home/community mobility. 2. Activity tolerance: Tolerate up in chair 1-2 hours for ADLs. 3. Patient/Family Education: Patient/family to be independent with HEP for follow-up care and safe discharge. Outcome: Progressing Towards Goal  PHYSICAL THERAPY TREATMENT     Patient: Ignacia Kapoor (57 y.o. male)  Date: 2017  Diagnosis: Closed left hip fracture, initial encounter [facility]) Closed left hip fracture [facility])  Precautions: Fall, TTWB   Chart, physical therapy assessment, plan of care and goals were reviewed. ASSESSMENT:  Pt making slow progress toward PT goals at this time. He continues to c/o L hip and R knee pain but is motivated to participate with PT. He required MaxA for sit-stand transfer with RW use and bed raised to higher level. Pt had difficulty maintaining TTWB precuations even with therapist's foot under pt's foot. Pt unable to take any steps or participate with gait training at this time. Left pt sitting up at the EOB for lunch with sister in the room. Educated pt and sister to call nursing to assist with getting pt back to bed. Recommend rehab at time of discharge. Progression toward goals:  [ ]      Improving appropriately and progressing toward goals  [X]      Improving slowly and progressing toward goals  [ ]      Not making progress toward goals and plan of care will be adjusted       PLAN:  Patient continues to benefit from skilled intervention to address the above impairments. Continue treatment per established plan of care.   Discharge Recommendations:  Rehab  Further Equipment Recommendations for Discharge:  rolling walker       SUBJECTIVE:   Patient stated I am feeling a little better.       OBJECTIVE DATA SUMMARY:   Critical Behavior:  Neurologic State: Alert  Orientation Level: Oriented X4  Cognition: Follows commands  Safety/Judgement: Awareness of environment, Fall prevention  Functional Mobility Training:  Bed Mobility:  Rolling: Moderate assistance; Additional time  Supine to Sit: Maximum assistance;Assist x2  Sit to Supine: Maximum assistance; Additional time  Scooting: Maximum assistance   Transfers:  Sit to Stand: Maximum assistance;Assist x2  Stand to Sit: Maximum assistance;Assist x2  Balance:  Sitting: Intact  Sitting - Static: Good (unsupported)  Sitting - Dynamic: Fair (occasional)  Standing: Impaired; With support  Standing - Static: Fair  Ambulation/Gait Training:   Left Side Weight Bearing: Toe touch   Therapeutic Exercises:   HEP included ankle pumps x 10 reps  Pain:  Pain Scale 1: Numeric (0 - 10)  Pain Intensity 1: 2  Pain Location 1: Hip;Knee  Pain Orientation 1: Left;Right  Pain Description 1: Aching  Pain Intervention(s) 1: Medication (see MAR)  Activity Tolerance:   Fair  Please refer to the flowsheet for vital signs taken during this treatment.   After treatment:   [ ] Patient left in no apparent distress sitting up in chair  [X] Patient left in no apparent distress in bed  [X] Call bell left within reach  [X] Nursing notified  [X] Caregiver present  [ ] Bed alarm activated        Shazia Gibbs PT, DPT      Time Calculation: 18 mins

## 2017-08-27 LAB
ATRIAL RATE: 74 BPM
CALCULATED P AXIS, ECG09: 21 DEGREES
CALCULATED R AXIS, ECG10: -53 DEGREES
CALCULATED T AXIS, ECG11: 0 DEGREES
DIAGNOSIS, 93000: NORMAL
P-R INTERVAL, ECG05: 170 MS
Q-T INTERVAL, ECG07: 366 MS
QRS DURATION, ECG06: 88 MS
QTC CALCULATION (BEZET), ECG08: 406 MS
VENTRICULAR RATE, ECG03: 74 BPM

## 2017-11-14 ENCOUNTER — HOSPITAL ENCOUNTER (OUTPATIENT)
Dept: CT IMAGING | Age: 72
Discharge: HOME | End: 2017-11-14
Attending: ORTHOPAEDIC SURGERY
Payer: MEDICARE

## 2017-11-14 DIAGNOSIS — M25.562 LEFT KNEE PAIN: ICD-10-CM

## 2017-11-14 DIAGNOSIS — M79.604 RIGHT LEG PAIN: ICD-10-CM

## 2017-11-14 DIAGNOSIS — M25.561 RIGHT KNEE PAIN: ICD-10-CM

## 2017-11-14 PROCEDURE — 73700 CT LOWER EXTREMITY W/O DYE: CPT

## 2017-12-11 ENCOUNTER — HOSPITAL ENCOUNTER (OUTPATIENT)
Dept: PREADMISSION TESTING | Age: 72
Discharge: HOME | End: 2017-12-11
Payer: MEDICARE

## 2017-12-11 VITALS — WEIGHT: 174 LBS | BODY MASS INDEX: 27.97 KG/M2 | HEIGHT: 66 IN

## 2017-12-11 LAB
ABO + RH BLD: NORMAL
ANION GAP SERPL CALC-SCNC: 10 MMOL/L (ref 3–18)
APPEARANCE UR: CLEAR
ATRIAL RATE: 63 BPM
BILIRUB UR QL: NEGATIVE
BLOOD GROUP ANTIBODIES SERPL: NORMAL
BUN SERPL-MCNC: 10 MG/DL (ref 7–18)
BUN/CREAT SERPL: 16 (ref 12–20)
CALCIUM SERPL-MCNC: 9.8 MG/DL (ref 8.5–10.1)
CALCULATED P AXIS, ECG09: 32 DEGREES
CALCULATED R AXIS, ECG10: -37 DEGREES
CALCULATED T AXIS, ECG11: 21 DEGREES
CHLORIDE SERPL-SCNC: 105 MMOL/L (ref 100–108)
CO2 SERPL-SCNC: 27 MMOL/L (ref 21–32)
COLOR UR: YELLOW
CREAT SERPL-MCNC: 0.63 MG/DL (ref 0.6–1.3)
DIAGNOSIS, 93000: NORMAL
ERYTHROCYTE [DISTWIDTH] IN BLOOD BY AUTOMATED COUNT: 13.5 % (ref 11.6–14.5)
EST. AVERAGE GLUCOSE BLD GHB EST-MCNC: 114 MG/DL
GLUCOSE SERPL-MCNC: 95 MG/DL (ref 74–99)
GLUCOSE UR STRIP.AUTO-MCNC: NEGATIVE MG/DL
HBA1C MFR BLD: 5.6 % (ref 4.5–5.6)
HCT VFR BLD AUTO: 40.3 % (ref 36–48)
HGB BLD-MCNC: 13.3 G/DL (ref 13–16)
HGB UR QL STRIP: NEGATIVE
KETONES UR QL STRIP.AUTO: NEGATIVE MG/DL
LEUKOCYTE ESTERASE UR QL STRIP.AUTO: NEGATIVE
MCH RBC QN AUTO: 29.6 PG (ref 24–34)
MCHC RBC AUTO-ENTMCNC: 33 G/DL (ref 31–37)
MCV RBC AUTO: 89.8 FL (ref 74–97)
NITRITE UR QL STRIP.AUTO: NEGATIVE
P-R INTERVAL, ECG05: 142 MS
PH UR STRIP: 7 [PH] (ref 5–8)
PLATELET # BLD AUTO: 245 K/UL (ref 135–420)
PMV BLD AUTO: 10.7 FL (ref 9.2–11.8)
POTASSIUM SERPL-SCNC: 4.1 MMOL/L (ref 3.5–5.5)
PROT UR STRIP-MCNC: NEGATIVE MG/DL
Q-T INTERVAL, ECG07: 408 MS
QRS DURATION, ECG06: 90 MS
QTC CALCULATION (BEZET), ECG08: 417 MS
RBC # BLD AUTO: 4.49 M/UL (ref 4.7–5.5)
SODIUM SERPL-SCNC: 142 MMOL/L (ref 136–145)
SP GR UR REFRACTOMETRY: 1 (ref 1–1.03)
SPECIMEN EXP DATE BLD: NORMAL
UROBILINOGEN UR QL STRIP.AUTO: 0.2 EU/DL (ref 0.2–1)
VENTRICULAR RATE, ECG03: 63 BPM
WBC # BLD AUTO: 8.9 K/UL (ref 4.6–13.2)

## 2017-12-11 PROCEDURE — 85027 COMPLETE CBC AUTOMATED: CPT | Performed by: ORTHOPAEDIC SURGERY

## 2017-12-11 PROCEDURE — 81003 URINALYSIS AUTO W/O SCOPE: CPT | Performed by: ORTHOPAEDIC SURGERY

## 2017-12-11 PROCEDURE — 86900 BLOOD TYPING SEROLOGIC ABO: CPT | Performed by: ORTHOPAEDIC SURGERY

## 2017-12-11 PROCEDURE — 87641 MR-STAPH DNA AMP PROBE: CPT | Performed by: ORTHOPAEDIC SURGERY

## 2017-12-11 PROCEDURE — 83036 HEMOGLOBIN GLYCOSYLATED A1C: CPT | Performed by: ORTHOPAEDIC SURGERY

## 2017-12-11 PROCEDURE — 80048 BASIC METABOLIC PNL TOTAL CA: CPT | Performed by: ORTHOPAEDIC SURGERY

## 2017-12-11 PROCEDURE — 93005 ELECTROCARDIOGRAM TRACING: CPT

## 2017-12-11 RX ORDER — CLINDAMYCIN PHOSPHATE 900 MG/50ML
900 INJECTION, SOLUTION INTRAVENOUS ONCE
Status: CANCELLED | OUTPATIENT
Start: 2017-12-11 | End: 2017-12-11

## 2017-12-11 RX ORDER — IBUPROFEN 200 MG
200 TABLET ORAL
COMMUNITY
End: 2017-12-20

## 2017-12-12 LAB
BACTERIA SPEC CULT: NORMAL
SERVICE CMNT-IMP: NORMAL

## 2017-12-18 ENCOUNTER — ANESTHESIA (OUTPATIENT)
Dept: SURGERY | Age: 72
DRG: 462 | End: 2017-12-18
Payer: MEDICARE

## 2017-12-18 ENCOUNTER — ANESTHESIA EVENT (OUTPATIENT)
Dept: SURGERY | Age: 72
DRG: 462 | End: 2017-12-18
Payer: MEDICARE

## 2017-12-18 ENCOUNTER — HOSPITAL ENCOUNTER (INPATIENT)
Age: 72
LOS: 2 days | Discharge: NURSING, REHAB OR LONG-TERM CARE | DRG: 462 | End: 2017-12-20
Attending: ORTHOPAEDIC SURGERY | Admitting: ORTHOPAEDIC SURGERY
Payer: MEDICARE

## 2017-12-18 PROBLEM — M19.90 DJD (DEGENERATIVE JOINT DISEASE): Status: ACTIVE | Noted: 2017-12-18

## 2017-12-18 PROBLEM — M17.9 DJD (DEGENERATIVE JOINT DISEASE) OF KNEE: Status: ACTIVE | Noted: 2017-12-18

## 2017-12-18 PROBLEM — M19.90 SENILE ARTHRITIS: Status: ACTIVE | Noted: 2017-12-18

## 2017-12-18 PROBLEM — M17.9 DJD (DEGENERATIVE JOINT DISEASE) OF KNEE: Status: RESOLVED | Noted: 2017-12-18 | Resolved: 2017-12-18

## 2017-12-18 PROCEDURE — 74011250636 HC RX REV CODE- 250/636

## 2017-12-18 PROCEDURE — 77030036563 HC WRP CLD THER KNE S2SG -B: Performed by: ORTHOPAEDIC SURGERY

## 2017-12-18 PROCEDURE — 77030002966 HC SUT PDS J&J -A: Performed by: ORTHOPAEDIC SURGERY

## 2017-12-18 PROCEDURE — 77030006804 HC BLD SAW RECIP CNMD -B: Performed by: ORTHOPAEDIC SURGERY

## 2017-12-18 PROCEDURE — 97116 GAIT TRAINING THERAPY: CPT

## 2017-12-18 PROCEDURE — 77030018846 HC SOL IRR STRL H20 ICUM -A: Performed by: ORTHOPAEDIC SURGERY

## 2017-12-18 PROCEDURE — 76210000063 HC OR PH I REC FIRST 0.5 HR: Performed by: ORTHOPAEDIC SURGERY

## 2017-12-18 PROCEDURE — C1776 JOINT DEVICE (IMPLANTABLE): HCPCS | Performed by: ORTHOPAEDIC SURGERY

## 2017-12-18 PROCEDURE — 76060000035 HC ANESTHESIA 2 TO 2.5 HR: Performed by: ORTHOPAEDIC SURGERY

## 2017-12-18 PROCEDURE — 77030020255 HC SOL INJ LR 1000ML BG: Performed by: ORTHOPAEDIC SURGERY

## 2017-12-18 PROCEDURE — 77030020782 HC GWN BAIR PAWS FLX 3M -B: Performed by: ORTHOPAEDIC SURGERY

## 2017-12-18 PROCEDURE — 74011250637 HC RX REV CODE- 250/637: Performed by: ORTHOPAEDIC SURGERY

## 2017-12-18 PROCEDURE — 77030013708 HC HNDPC SUC IRR PULS STRY –B: Performed by: ORTHOPAEDIC SURGERY

## 2017-12-18 PROCEDURE — G8979 MOBILITY GOAL STATUS: HCPCS

## 2017-12-18 PROCEDURE — 74011250636 HC RX REV CODE- 250/636: Performed by: ANESTHESIOLOGY

## 2017-12-18 PROCEDURE — 74011000258 HC RX REV CODE- 258: Performed by: ORTHOPAEDIC SURGERY

## 2017-12-18 PROCEDURE — G8978 MOBILITY CURRENT STATUS: HCPCS

## 2017-12-18 PROCEDURE — 0SRC0LA REPLACEMENT OF RIGHT KNEE JOINT WITH MEDIAL UNICONDYLAR SYNTHETIC SUBSTITUTE, UNCEMENTED, OPEN APPROACH: ICD-10-PCS | Performed by: ORTHOPAEDIC SURGERY

## 2017-12-18 PROCEDURE — 77030018836 HC SOL IRR NACL ICUM -A: Performed by: ORTHOPAEDIC SURGERY

## 2017-12-18 PROCEDURE — 65270000029 HC RM PRIVATE

## 2017-12-18 PROCEDURE — 77030027138 HC INCENT SPIROMETER -A: Performed by: ORTHOPAEDIC SURGERY

## 2017-12-18 PROCEDURE — 77030006802 HC BLD SAW RECIP BRSM -B: Performed by: ORTHOPAEDIC SURGERY

## 2017-12-18 PROCEDURE — 77030012508 HC MSK AIRWY LMA AMBU -A: Performed by: ANESTHESIOLOGY

## 2017-12-18 PROCEDURE — 77030011640 HC PAD GRND REM COVD -A: Performed by: ORTHOPAEDIC SURGERY

## 2017-12-18 PROCEDURE — 0SRD0LA REPLACEMENT OF LEFT KNEE JOINT WITH MEDIAL UNICONDYLAR SYNTHETIC SUBSTITUTE, UNCEMENTED, OPEN APPROACH: ICD-10-PCS | Performed by: ORTHOPAEDIC SURGERY

## 2017-12-18 PROCEDURE — 74011250637 HC RX REV CODE- 250/637: Performed by: ANESTHESIOLOGY

## 2017-12-18 PROCEDURE — 77030016547 HC BLD SAW SAG1 STRY -B: Performed by: ORTHOPAEDIC SURGERY

## 2017-12-18 PROCEDURE — 77030020813 HC INST SCULP CEM KT DISP S&N -B: Performed by: ORTHOPAEDIC SURGERY

## 2017-12-18 PROCEDURE — 77030034479 HC ADH SKN CLSR PRINEO J&J -B: Performed by: ORTHOPAEDIC SURGERY

## 2017-12-18 PROCEDURE — 77030003028 HC SUT VCRL J&J -A: Performed by: ORTHOPAEDIC SURGERY

## 2017-12-18 PROCEDURE — 74011250636 HC RX REV CODE- 250/636: Performed by: ORTHOPAEDIC SURGERY

## 2017-12-18 PROCEDURE — 77030033263 HC DRSG MEPILEX 16-48IN BORD MOLN -B: Performed by: ORTHOPAEDIC SURGERY

## 2017-12-18 PROCEDURE — 77030011943

## 2017-12-18 PROCEDURE — 77010033678 HC OXYGEN DAILY

## 2017-12-18 PROCEDURE — C1713 ANCHOR/SCREW BN/BN,TIS/BN: HCPCS | Performed by: ORTHOPAEDIC SURGERY

## 2017-12-18 PROCEDURE — C9290 INJ, BUPIVACAINE LIPOSOME: HCPCS | Performed by: ORTHOPAEDIC SURGERY

## 2017-12-18 PROCEDURE — 77030020259 HC SOL INJ SOD CL 0.9% 100ML BG: Performed by: ORTHOPAEDIC SURGERY

## 2017-12-18 PROCEDURE — 74011000250 HC RX REV CODE- 250

## 2017-12-18 PROCEDURE — 77030022295 HC SEAL BPLR VSL DISP MEDT -F: Performed by: ORTHOPAEDIC SURGERY

## 2017-12-18 PROCEDURE — 76010000131 HC OR TIME 2 TO 2.5 HR: Performed by: ORTHOPAEDIC SURGERY

## 2017-12-18 PROCEDURE — 77030011628: Performed by: ORTHOPAEDIC SURGERY

## 2017-12-18 PROCEDURE — 77030020754 HC CUF TRNQT 2BLA STRY -B: Performed by: ORTHOPAEDIC SURGERY

## 2017-12-18 PROCEDURE — 97162 PT EVAL MOD COMPLEX 30 MIN: CPT

## 2017-12-18 PROCEDURE — 77030016661 HC BUR RND1 STRY -C: Performed by: ORTHOPAEDIC SURGERY

## 2017-12-18 PROCEDURE — 77030031118: Performed by: ORTHOPAEDIC SURGERY

## 2017-12-18 PROCEDURE — 74011000250 HC RX REV CODE- 250: Performed by: ORTHOPAEDIC SURGERY

## 2017-12-18 PROCEDURE — 77030032490 HC SLV COMPR SCD KNE COVD -B: Performed by: ORTHOPAEDIC SURGERY

## 2017-12-18 DEVICE — SYSTEM TKR L MED LUNI KNEE UNI UNI KT: Type: IMPLANTABLE DEVICE | Site: KNEE | Status: FUNCTIONAL

## 2017-12-18 DEVICE — SYSTEM TKR R MED LUNI KNEE UNI UNI KT: Type: IMPLANTABLE DEVICE | Site: KNEE | Status: FUNCTIONAL

## 2017-12-18 DEVICE — CEMENT BNE 20GM HALF DOSE PMMA VISC RADPQ FAST: Type: IMPLANTABLE DEVICE | Site: KNEE | Status: FUNCTIONAL

## 2017-12-18 RX ORDER — ONDANSETRON 2 MG/ML
INJECTION INTRAMUSCULAR; INTRAVENOUS AS NEEDED
Status: DISCONTINUED | OUTPATIENT
Start: 2017-12-18 | End: 2017-12-18 | Stop reason: HOSPADM

## 2017-12-18 RX ORDER — PREGABALIN 50 MG/1
50 CAPSULE ORAL
Status: COMPLETED | OUTPATIENT
Start: 2017-12-18 | End: 2017-12-18

## 2017-12-18 RX ORDER — DOCUSATE SODIUM 100 MG/1
100 CAPSULE, LIQUID FILLED ORAL 2 TIMES DAILY
Status: DISCONTINUED | OUTPATIENT
Start: 2017-12-18 | End: 2017-12-20 | Stop reason: HOSPADM

## 2017-12-18 RX ORDER — PREGABALIN 75 MG/1
75 CAPSULE ORAL 2 TIMES DAILY
Status: DISCONTINUED | OUTPATIENT
Start: 2017-12-18 | End: 2017-12-20 | Stop reason: HOSPADM

## 2017-12-18 RX ORDER — SODIUM CHLORIDE 0.9 % (FLUSH) 0.9 %
5-10 SYRINGE (ML) INJECTION EVERY 8 HOURS
Status: DISCONTINUED | OUTPATIENT
Start: 2017-12-18 | End: 2017-12-20 | Stop reason: HOSPADM

## 2017-12-18 RX ORDER — PROPOFOL 10 MG/ML
INJECTION, EMULSION INTRAVENOUS AS NEEDED
Status: DISCONTINUED | OUTPATIENT
Start: 2017-12-18 | End: 2017-12-18 | Stop reason: HOSPADM

## 2017-12-18 RX ORDER — OXYCODONE HCL 10 MG/1
10 TABLET, FILM COATED, EXTENDED RELEASE ORAL EVERY 12 HOURS
Status: DISCONTINUED | OUTPATIENT
Start: 2017-12-18 | End: 2017-12-20 | Stop reason: HOSPADM

## 2017-12-18 RX ORDER — KETOROLAC TROMETHAMINE 15 MG/ML
15 INJECTION, SOLUTION INTRAMUSCULAR; INTRAVENOUS EVERY 6 HOURS
Status: COMPLETED | OUTPATIENT
Start: 2017-12-18 | End: 2017-12-19

## 2017-12-18 RX ORDER — FLUMAZENIL 0.1 MG/ML
0.2 INJECTION INTRAVENOUS
Status: DISCONTINUED | OUTPATIENT
Start: 2017-12-18 | End: 2017-12-18 | Stop reason: HOSPADM

## 2017-12-18 RX ORDER — ENOXAPARIN SODIUM 100 MG/ML
40 INJECTION SUBCUTANEOUS DAILY
Status: DISCONTINUED | OUTPATIENT
Start: 2017-12-18 | End: 2017-12-20 | Stop reason: HOSPADM

## 2017-12-18 RX ORDER — FENTANYL CITRATE 50 UG/ML
50 INJECTION, SOLUTION INTRAMUSCULAR; INTRAVENOUS
Status: DISCONTINUED | OUTPATIENT
Start: 2017-12-18 | End: 2017-12-18 | Stop reason: HOSPADM

## 2017-12-18 RX ORDER — SODIUM CHLORIDE, SODIUM LACTATE, POTASSIUM CHLORIDE, CALCIUM CHLORIDE 600; 310; 30; 20 MG/100ML; MG/100ML; MG/100ML; MG/100ML
75 INJECTION, SOLUTION INTRAVENOUS CONTINUOUS
Status: DISPENSED | OUTPATIENT
Start: 2017-12-18 | End: 2017-12-19

## 2017-12-18 RX ORDER — NALOXONE HYDROCHLORIDE 0.4 MG/ML
0.4 INJECTION, SOLUTION INTRAMUSCULAR; INTRAVENOUS; SUBCUTANEOUS AS NEEDED
Status: DISCONTINUED | OUTPATIENT
Start: 2017-12-18 | End: 2017-12-20 | Stop reason: HOSPADM

## 2017-12-18 RX ORDER — DEXAMETHASONE SODIUM PHOSPHATE 4 MG/ML
INJECTION, SOLUTION INTRA-ARTICULAR; INTRALESIONAL; INTRAMUSCULAR; INTRAVENOUS; SOFT TISSUE AS NEEDED
Status: DISCONTINUED | OUTPATIENT
Start: 2017-12-18 | End: 2017-12-18 | Stop reason: HOSPADM

## 2017-12-18 RX ORDER — HYDROMORPHONE HYDROCHLORIDE 2 MG/ML
INJECTION, SOLUTION INTRAMUSCULAR; INTRAVENOUS; SUBCUTANEOUS AS NEEDED
Status: DISCONTINUED | OUTPATIENT
Start: 2017-12-18 | End: 2017-12-18 | Stop reason: HOSPADM

## 2017-12-18 RX ORDER — ACETAMINOPHEN 325 MG/1
650 TABLET ORAL 4 TIMES DAILY
Status: DISCONTINUED | OUTPATIENT
Start: 2017-12-18 | End: 2017-12-20 | Stop reason: HOSPADM

## 2017-12-18 RX ORDER — CEFAZOLIN SODIUM 2 G/50ML
2 SOLUTION INTRAVENOUS ONCE
Status: COMPLETED | OUTPATIENT
Start: 2017-12-18 | End: 2017-12-18

## 2017-12-18 RX ORDER — DIPHENHYDRAMINE HCL 25 MG
25 CAPSULE ORAL
Status: DISCONTINUED | OUTPATIENT
Start: 2017-12-18 | End: 2017-12-20 | Stop reason: HOSPADM

## 2017-12-18 RX ORDER — PROCHLORPERAZINE EDISYLATE 5 MG/ML
5 INJECTION INTRAMUSCULAR; INTRAVENOUS
Status: DISCONTINUED | OUTPATIENT
Start: 2017-12-18 | End: 2017-12-20 | Stop reason: HOSPADM

## 2017-12-18 RX ORDER — LIDOCAINE HYDROCHLORIDE 20 MG/ML
INJECTION, SOLUTION EPIDURAL; INFILTRATION; INTRACAUDAL; PERINEURAL AS NEEDED
Status: DISCONTINUED | OUTPATIENT
Start: 2017-12-18 | End: 2017-12-18 | Stop reason: HOSPADM

## 2017-12-18 RX ORDER — SODIUM CHLORIDE 0.9 % (FLUSH) 0.9 %
5-10 SYRINGE (ML) INJECTION AS NEEDED
Status: DISCONTINUED | OUTPATIENT
Start: 2017-12-18 | End: 2017-12-20 | Stop reason: HOSPADM

## 2017-12-18 RX ORDER — SODIUM CHLORIDE, SODIUM LACTATE, POTASSIUM CHLORIDE, CALCIUM CHLORIDE 600; 310; 30; 20 MG/100ML; MG/100ML; MG/100ML; MG/100ML
125 INJECTION, SOLUTION INTRAVENOUS CONTINUOUS
Status: DISCONTINUED | OUTPATIENT
Start: 2017-12-18 | End: 2017-12-18

## 2017-12-18 RX ORDER — SODIUM CHLORIDE, SODIUM LACTATE, POTASSIUM CHLORIDE, CALCIUM CHLORIDE 600; 310; 30; 20 MG/100ML; MG/100ML; MG/100ML; MG/100ML
100 INJECTION, SOLUTION INTRAVENOUS CONTINUOUS
Status: DISCONTINUED | OUTPATIENT
Start: 2017-12-18 | End: 2017-12-18 | Stop reason: HOSPADM

## 2017-12-18 RX ORDER — CEFAZOLIN SODIUM 2 G/50ML
2 SOLUTION INTRAVENOUS EVERY 8 HOURS
Status: COMPLETED | OUTPATIENT
Start: 2017-12-18 | End: 2017-12-18

## 2017-12-18 RX ORDER — HYDROMORPHONE HYDROCHLORIDE 2 MG/ML
0.5 INJECTION, SOLUTION INTRAMUSCULAR; INTRAVENOUS; SUBCUTANEOUS
Status: DISCONTINUED | OUTPATIENT
Start: 2017-12-18 | End: 2017-12-18 | Stop reason: HOSPADM

## 2017-12-18 RX ORDER — OXYCODONE HYDROCHLORIDE 5 MG/1
5 TABLET ORAL
Status: DISCONTINUED | OUTPATIENT
Start: 2017-12-18 | End: 2017-12-20 | Stop reason: HOSPADM

## 2017-12-18 RX ORDER — FENTANYL CITRATE 50 UG/ML
INJECTION, SOLUTION INTRAMUSCULAR; INTRAVENOUS AS NEEDED
Status: DISCONTINUED | OUTPATIENT
Start: 2017-12-18 | End: 2017-12-18 | Stop reason: HOSPADM

## 2017-12-18 RX ORDER — ACETAMINOPHEN 10 MG/ML
1000 INJECTION, SOLUTION INTRAVENOUS ONCE
Status: COMPLETED | OUTPATIENT
Start: 2017-12-18 | End: 2017-12-18

## 2017-12-18 RX ORDER — ASCORBIC ACID 250 MG
1000 TABLET ORAL 2 TIMES DAILY WITH MEALS
Status: DISCONTINUED | OUTPATIENT
Start: 2017-12-18 | End: 2017-12-20 | Stop reason: HOSPADM

## 2017-12-18 RX ORDER — EPHEDRINE SULFATE/0.9% NACL/PF 25 MG/5 ML
SYRINGE (ML) INTRAVENOUS AS NEEDED
Status: DISCONTINUED | OUTPATIENT
Start: 2017-12-18 | End: 2017-12-18 | Stop reason: HOSPADM

## 2017-12-18 RX ORDER — ZOLPIDEM TARTRATE 5 MG/1
5 TABLET ORAL
Status: DISCONTINUED | OUTPATIENT
Start: 2017-12-18 | End: 2017-12-20 | Stop reason: HOSPADM

## 2017-12-18 RX ORDER — TRAMADOL HYDROCHLORIDE 50 MG/1
50 TABLET ORAL 4 TIMES DAILY
Status: DISCONTINUED | OUTPATIENT
Start: 2017-12-18 | End: 2017-12-20 | Stop reason: HOSPADM

## 2017-12-18 RX ORDER — NALOXONE HYDROCHLORIDE 0.4 MG/ML
0.4 INJECTION, SOLUTION INTRAMUSCULAR; INTRAVENOUS; SUBCUTANEOUS AS NEEDED
Status: DISCONTINUED | OUTPATIENT
Start: 2017-12-18 | End: 2017-12-18 | Stop reason: HOSPADM

## 2017-12-18 RX ORDER — CELECOXIB 100 MG/1
400 CAPSULE ORAL
Status: COMPLETED | OUTPATIENT
Start: 2017-12-18 | End: 2017-12-18

## 2017-12-18 RX ORDER — ONDANSETRON 2 MG/ML
4 INJECTION INTRAMUSCULAR; INTRAVENOUS ONCE
Status: DISCONTINUED | OUTPATIENT
Start: 2017-12-18 | End: 2017-12-18 | Stop reason: HOSPADM

## 2017-12-18 RX ORDER — HYDROMORPHONE HYDROCHLORIDE 1 MG/ML
1 INJECTION, SOLUTION INTRAMUSCULAR; INTRAVENOUS; SUBCUTANEOUS
Status: DISCONTINUED | OUTPATIENT
Start: 2017-12-18 | End: 2017-12-20 | Stop reason: HOSPADM

## 2017-12-18 RX ORDER — DIPHENHYDRAMINE HYDROCHLORIDE 50 MG/ML
12.5 INJECTION, SOLUTION INTRAMUSCULAR; INTRAVENOUS
Status: DISCONTINUED | OUTPATIENT
Start: 2017-12-18 | End: 2017-12-20 | Stop reason: HOSPADM

## 2017-12-18 RX ADMIN — PREGABALIN 50 MG: 50 CAPSULE ORAL at 08:21

## 2017-12-18 RX ADMIN — FENTANYL CITRATE 25 MCG: 50 INJECTION, SOLUTION INTRAMUSCULAR; INTRAVENOUS at 10:28

## 2017-12-18 RX ADMIN — CEFAZOLIN SODIUM 2 G: 2 SOLUTION INTRAVENOUS at 13:33

## 2017-12-18 RX ADMIN — CELECOXIB 400 MG: 100 CAPSULE ORAL at 08:21

## 2017-12-18 RX ADMIN — SODIUM CHLORIDE, SODIUM LACTATE, POTASSIUM CHLORIDE, AND CALCIUM CHLORIDE 125 ML/HR: 600; 310; 30; 20 INJECTION, SOLUTION INTRAVENOUS at 07:22

## 2017-12-18 RX ADMIN — HYDROMORPHONE HYDROCHLORIDE 0.2 MG: 2 INJECTION, SOLUTION INTRAMUSCULAR; INTRAVENOUS; SUBCUTANEOUS at 08:55

## 2017-12-18 RX ADMIN — KETOROLAC TROMETHAMINE 15 MG: 15 INJECTION, SOLUTION INTRAMUSCULAR; INTRAVENOUS at 11:32

## 2017-12-18 RX ADMIN — CEFAZOLIN SODIUM 2 G: 2 SOLUTION INTRAVENOUS at 08:34

## 2017-12-18 RX ADMIN — FENTANYL CITRATE 25 MCG: 50 INJECTION, SOLUTION INTRAMUSCULAR; INTRAVENOUS at 09:38

## 2017-12-18 RX ADMIN — SODIUM CHLORIDE, SODIUM LACTATE, POTASSIUM CHLORIDE, AND CALCIUM CHLORIDE: 600; 310; 30; 20 INJECTION, SOLUTION INTRAVENOUS at 09:17

## 2017-12-18 RX ADMIN — PROPOFOL 150 MG: 10 INJECTION, EMULSION INTRAVENOUS at 08:37

## 2017-12-18 RX ADMIN — MULTIPLE VITAMINS W/ MINERALS TAB 1 TABLET: TAB at 13:33

## 2017-12-18 RX ADMIN — ACETAMINOPHEN 650 MG: 325 TABLET ORAL at 21:01

## 2017-12-18 RX ADMIN — LIDOCAINE HYDROCHLORIDE 100 MG: 20 INJECTION, SOLUTION EPIDURAL; INFILTRATION; INTRACAUDAL; PERINEURAL at 08:37

## 2017-12-18 RX ADMIN — OXYCODONE HYDROCHLORIDE 10 MG: 10 TABLET, FILM COATED, EXTENDED RELEASE ORAL at 21:01

## 2017-12-18 RX ADMIN — HYDROMORPHONE HYDROCHLORIDE 0.2 MG: 2 INJECTION, SOLUTION INTRAMUSCULAR; INTRAVENOUS; SUBCUTANEOUS at 08:49

## 2017-12-18 RX ADMIN — FENTANYL CITRATE 25 MCG: 50 INJECTION, SOLUTION INTRAMUSCULAR; INTRAVENOUS at 09:06

## 2017-12-18 RX ADMIN — ENOXAPARIN SODIUM 40 MG: 40 INJECTION SUBCUTANEOUS at 17:27

## 2017-12-18 RX ADMIN — TRAMADOL HYDROCHLORIDE 50 MG: 50 TABLET, FILM COATED ORAL at 21:01

## 2017-12-18 RX ADMIN — DEXAMETHASONE SODIUM PHOSPHATE 4 MG: 4 INJECTION, SOLUTION INTRA-ARTICULAR; INTRALESIONAL; INTRAMUSCULAR; INTRAVENOUS; SOFT TISSUE at 10:24

## 2017-12-18 RX ADMIN — PREGABALIN 75 MG: 75 CAPSULE ORAL at 21:01

## 2017-12-18 RX ADMIN — FENTANYL CITRATE 25 MCG: 50 INJECTION, SOLUTION INTRAMUSCULAR; INTRAVENOUS at 08:59

## 2017-12-18 RX ADMIN — FENTANYL CITRATE 100 MCG: 50 INJECTION, SOLUTION INTRAMUSCULAR; INTRAVENOUS at 08:30

## 2017-12-18 RX ADMIN — HYDROMORPHONE HYDROCHLORIDE 0.2 MG: 2 INJECTION, SOLUTION INTRAMUSCULAR; INTRAVENOUS; SUBCUTANEOUS at 09:06

## 2017-12-18 RX ADMIN — Medication 1000 MG: at 17:27

## 2017-12-18 RX ADMIN — HYDROMORPHONE HYDROCHLORIDE 0.2 MG: 2 INJECTION, SOLUTION INTRAMUSCULAR; INTRAVENOUS; SUBCUTANEOUS at 08:51

## 2017-12-18 RX ADMIN — DOCUSATE SODIUM 100 MG: 100 CAPSULE, LIQUID FILLED ORAL at 13:33

## 2017-12-18 RX ADMIN — KETOROLAC TROMETHAMINE 15 MG: 15 INJECTION, SOLUTION INTRAMUSCULAR; INTRAVENOUS at 23:16

## 2017-12-18 RX ADMIN — OXYCODONE HYDROCHLORIDE 10 MG: 10 TABLET, FILM COATED, EXTENDED RELEASE ORAL at 13:33

## 2017-12-18 RX ADMIN — HYDROMORPHONE HYDROCHLORIDE 0.2 MG: 2 INJECTION, SOLUTION INTRAMUSCULAR; INTRAVENOUS; SUBCUTANEOUS at 09:16

## 2017-12-18 RX ADMIN — TRAMADOL HYDROCHLORIDE 50 MG: 50 TABLET, FILM COATED ORAL at 13:33

## 2017-12-18 RX ADMIN — ONDANSETRON 4 MG: 2 INJECTION INTRAMUSCULAR; INTRAVENOUS at 09:37

## 2017-12-18 RX ADMIN — DOCUSATE SODIUM 100 MG: 100 CAPSULE, LIQUID FILLED ORAL at 21:00

## 2017-12-18 RX ADMIN — KETOROLAC TROMETHAMINE 15 MG: 15 INJECTION, SOLUTION INTRAMUSCULAR; INTRAVENOUS at 17:27

## 2017-12-18 RX ADMIN — HYDROMORPHONE HYDROCHLORIDE 0.2 MG: 2 INJECTION, SOLUTION INTRAMUSCULAR; INTRAVENOUS; SUBCUTANEOUS at 08:58

## 2017-12-18 RX ADMIN — ACETAMINOPHEN 1000 MG: 10 INJECTION, SOLUTION INTRAVENOUS at 09:30

## 2017-12-18 RX ADMIN — Medication 10 MG: at 08:40

## 2017-12-18 RX ADMIN — CEFAZOLIN SODIUM 2 G: 2 SOLUTION INTRAVENOUS at 21:00

## 2017-12-18 RX ADMIN — TRAMADOL HYDROCHLORIDE 50 MG: 50 TABLET, FILM COATED ORAL at 17:27

## 2017-12-18 RX ADMIN — ACETAMINOPHEN 650 MG: 325 TABLET ORAL at 17:27

## 2017-12-18 RX ADMIN — HYDROMORPHONE HYDROCHLORIDE 0.2 MG: 2 INJECTION, SOLUTION INTRAMUSCULAR; INTRAVENOUS; SUBCUTANEOUS at 09:38

## 2017-12-18 RX ADMIN — SODIUM CHLORIDE, SODIUM LACTATE, POTASSIUM CHLORIDE, AND CALCIUM CHLORIDE 75 ML/HR: 600; 310; 30; 20 INJECTION, SOLUTION INTRAVENOUS at 13:34

## 2017-12-18 NOTE — PROGRESS NOTES
Problem: Mobility Impaired (Adult and Pediatric)  Goal: *Acute Goals and Plan of Care (Insert Text)  Physical Therapy Goals LT/ST  Initiated 12/18/2017 and to be accomplished within 3-5 day(s)  1. Patient will move from supine <> sit with S in prep for out of bed activity and change of position. 2.  Patient will perform sit<> stand with S with LRAD in prep for transfers/ambulation. 3.  Patient will transfer from bed <> chair with S with LRAD for time up in chair for completion of ADL activity. 4.  Patient will ambulate 150 feet with LRAD/S for improved functional mobility/safe discharge. 5.  Patient will ascend/descend  > 1 step with minimal assistance/contact guard assist for home re-entry as needed. Outcome: Progressing Towards Goal  physical Therapy EVALUATION    Patient: Amando Parrish (25 y.o. male)  Date: 12/18/2017  Primary Diagnosis: KNEE DEGENERATIVE JOINT DISEASE, GALACTOSEMIA  DJD (degenerative joint disease) of knee  DJD (degenerative joint disease) of knee  Procedure(s) (LRB):  BILATERAL UNI KNEES-MEDIAL W/CONFORMIS (Bilateral) Day of Surgery   Precautions:   Fall, WBAT    ASSESSMENT :  Based on the objective data described below, the patient presents with decrease independence w/ bed mobility, transfers, gait, and step negotiation. Pt seen in bed eating w/ all bed rails up, supplemental O2, IV, BP cuff, pulse ox, and B/L SCDs donned. Pt reports no pain in B/L knees at this time and no c/o lightheadedness or dizziness. Pt's vitals assessed WNLs. During assessment pt demonstrates difficulty w/ processing command from PT and requires constant VCing during session. Pt however is able to perform a SLR and demonstrates no difficulty performing quad sets. Once in standing, pt was able to demonstrate side steps w/ RW w/ manual cues and VCing for proper gait sequencing. Pt was transferred back to bed after gait assessment.  Pt left in supine after session, call bell and tray in reach, BP and pulse ox donned, B/L SCDs donned, vitals assessed WNLs, sister present in the room, nurse notified after session. At this time pt and family wanting rehab upon d/c secondary to pt living alone, not having support, and unable to safely be home alone due to pmh upon d/c. Patient will benefit from skilled intervention to address the above impairments. Patients rehabilitation potential is considered to be Good  Factors which may influence rehabilitation potential include:   []         None noted  [x]         Mental ability/status  [x]         Medical condition  [x]         Home/family situation and support systems  [x]         Safety awareness  [x]         Pain tolerance/management  []         Other:      PLAN :  Recommendations and Planned Interventions:  [x]           Bed Mobility Training             []    Neuromuscular Re-Education  [x]           Transfer Training                   []    Orthotic/Prosthetic Training  [x]           Gait Training                          []    Modalities  [x]           Therapeutic Exercises          []    Edema Management/Control  [x]           Therapeutic Activities            [x]    Patient and Family Training/Education  []           Other (comment):    Frequency/Duration: Patient will be followed by physical therapy once/twice daily to address goals. Discharge Recommendations: Rehab  Further Equipment Recommendations for Discharge: N/A     SUBJECTIVE:   Patient stated I don't have any pain right now.     OBJECTIVE DATA SUMMARY:     Past Medical History:   Diagnosis Date    Arthritis     DJD    BPH (benign prostatic hypertrophy)     Cataracts, bilateral     COPD     Dx with mild COPD- Ruled out by Dr. Myrtle Waters [facility])     Ill-defined condition     dermatitis side of mouth    Lymphedema of lower extremity     right    Mild mental retardation     high functioning     Past Surgical History:   Procedure Laterality Date    HX GI  2005    colonoscopy    HX HERNIA REPAIR      right inguinal as a child    HX LUMBAR FUSION  2008    L2-3    HX LUMBAR FUSION  4-22-13    L2-5    HX MOHS PROCEDURES Left     HX ORTHOPAEDIC Right 2007    hip pinning    HX PROSTATECTOMY  8-1-13    Greenlight laser    HX SHOULDER REPLACEMENT Right 2012    right total shoulder    HX UROLOGICAL  8-1-2013    green light laser    SPINE SURGERY PROCEDURE UNLISTED       Barriers to Learning/Limitations: yes;  cognitive and physical  Compensate with: Verbal Cues and Tactile Cues  Prior Level of Function/Home Situation:   Home Situation  Home Environment: Private residence  Wheelchair Ramp: Yes  One/Two Story Residence: One story  Living Alone: Yes  Support Systems: Family member(s)  Patient Expects to be Discharged to[de-identified] Rehabilitation facility  Current DME Used/Available at Home: Lift chair, Walker, rolling  Critical Behavior:  Neurologic State: Alert; Appropriate for age  Orientation Level: Oriented X4  Cognition: Follows commands; Impaired decision making  Safety/Judgement: Awareness of environment; Fall prevention  Skin Condition/Temp: Warm;Dry  Skin Integrity: Incision (comment) (bilateral knees)  Skin Integumentary  Skin Color: Appropriate for ethnicity  Skin Condition/Temp: Warm;Dry  Skin Integrity: Incision (comment) (bilateral knees)  Turgor: Non-tenting  Hair Growth: Present  Varicosities: Absent  Strength:    Strength: Generally decreased, functional  Tone & Sensation:   Tone: Normal  Sensation: Intact  Range Of Motion:  AROM: Generally decreased, functional  PROM: Generally decreased, functional  Functional Mobility:  Bed Mobility:  Supine to Sit: Contact guard assistance  Sit to Supine: Contact guard assistance  Scooting: Stand-by asssistance  Transfers:  Sit to Stand: Minimum assistance; Moderate assistance  Stand to Sit: Contact guard assistance;Minimum assistance  Balance:   Sitting: Intact  Standing: Impaired; With support  Standing - Static: Fair  Standing - Dynamic : Fair  Ambulation/Gait Training:  Distance (ft): 6 Feet (ft)  Assistive Device: Gait belt;Walker, rolling  Ambulation - Level of Assistance: Contact guard assistance;Minimal assistance  Gait Description (WDL): Exceptions to WDL  Gait Abnormalities: Decreased step clearance;Shuffling gait; Step to gait  Right Side Weight Bearing: As tolerated  Left Side Weight Bearing: As tolerated  Base of Support: Narrowed  Stance: Time  Speed/Trang: Shuffled; Slow  Step Length: Left shortened;Right shortened  Swing Pattern: Left asymmetrical;Right asymmetrical  Therapeutic Exercises:   Exercise packet handed to pt per protocol  Pain:  Pain Scale 1: Numeric (0 - 10)  Pain Intensity 1: 0  Activity Tolerance:   Fair  Please refer to the flowsheet for vital signs taken during this treatment. After treatment:   []         Patient left in no apparent distress sitting up in chair  [x]         Patient left in no apparent distress in bed  [x]         Call bell left within reach  [x]         Nursing notified  []         Caregiver present  []         Bed alarm activated    COMMUNICATION/EDUCATION:   [x]         Fall prevention education was provided and the patient/caregiver indicated understanding. [x]         Patient/family have participated as able in goal setting and plan of care. [x]         Patient/family agree to work toward stated goals and plan of care. []         Patient understands intent and goals of therapy, but is neutral about his/her participation. []         Patient is unable to participate in goal setting and plan of care. Thank you for this referral.  Nagi Dutta, PT   Time Calculation: 24 mins     Mobility:  Current  CK= 40-59%   Goal  CI= 1-19%. The severity rating is based on the Other Based on fucntional assessment

## 2017-12-18 NOTE — PHYSICIAN ADVISORY
Letter of status determination     Etta Lion   Age: 71 y.o. MRN: [id number]  Admitting physician: [address]: Payor: Adelso Huff / Plan: [place] MEDICARE PART A & B / Product Type: Medicare /     Date of admission:  12/18/2017    I have reviewed this case as it involves a Medicare patient not meeting criteria for Inpatient services. The patient underwent an elective surgical intervention today. The procedure is not on the current Medicare Inpatient Only List. The patient tolerated the procedure well, without any documented complications. There were no unplanned/unexpected complications to warrant Observation or Inpatient status. Observation/Inpatient is restricted to situations where a patient exhibits an uncommon or unusual reaction to a surgical procedure, and the condition requires monitoring and treatment beyond the treatment customarily provided in the immediate postoperative period. Therefore, our recommendation is to place Etta Lion in Outpatient status. The final decision regarding the patient's hospitalization status depends on the attending physician's judgment.          Hannah Bro MD, RAMANDEEP, [facility], [facility]  Physician [place].    December 18, 2017   1:33 PM

## 2017-12-18 NOTE — PERIOP NOTES
Verbal hand off at the bedside with Narda Jackson RN provided opportunity for questions. The family member was updated and escorted to patients room #201.

## 2017-12-18 NOTE — BRIEF OP NOTE
BRIEF OPERATIVE NOTE    Date of Procedure: 12/18/2017   Preoperative Diagnosis: KNEE DEGENERATIVE JOINT DISEASE  Postoperative Diagnosis: KNEE DEGENERATIVE JOINT DISEASE    Procedure(s):  BILATERAL UNI KNEES-MEDIAL W/CONFORMIS  Surgeon(s) and Role:     * Janette Vega MD - Primary         Assistant Staff:       Surgical Staff:  Circ-1: Basilio Velazquez  Scrub Tech-1: Joanthan Hussein  Scrub RN-1: Sobeida Weiss RN  Scrub Private/Assistant: Devin Downing CNA  No case tracking events are documented in the log.   Anesthesia: General   Estimated Blood Loss: 50  Specimens: * No specimens in log *   Findings: djd   Complications: none  Implants: * No implants in log *

## 2017-12-18 NOTE — PROGRESS NOTES
Cm met with patients sister at bedside,appeared to be concerned and upset regarding outpatient status, informed cm that patient has MR and she and her sister are both POA of patient,sister informed cm that she spoke with  prior to procedure plan was to have pt as inpatient, cm notified  regarding outpatient status stated he meant to make patient as inpatient, verbal order received to make pt inpatient at this time, cm notified unit manager update was provided.

## 2017-12-18 NOTE — ANESTHESIA PREPROCEDURE EVALUATION
Anesthetic History   No history of anesthetic complications            Review of Systems / Medical History  Patient summary reviewed, nursing notes reviewed and pertinent labs reviewed    Pulmonary    COPD: mild          Pertinent negatives: No pneumonia, asthma, recent URI and sleep apnea  Comments: Former smoker   Neuro/Psych   Within defined limits           Cardiovascular  Within defined limits                Exercise tolerance: >4 METS     GI/Hepatic/Renal  Within defined limits              Endo/Other        Arthritis  Pertinent negatives: No diabetes, hypothyroidism, hyperthyroidism, obesity and blood dyscrasia   Other Findings              Physical Exam    Airway  Mallampati: III  TM Distance: 4 - 6 cm  Neck ROM: decreased range of motion   Mouth opening: Normal     Cardiovascular  Regular rate and rhythm,  S1 and S2 normal,  no murmur, click, rub, or gallop             Dental  No notable dental hx       Pulmonary  Breath sounds clear to auscultation               Abdominal  GI exam deferred       Other Findings            Anesthetic Plan    ASA: 3  Anesthesia type: general          Induction: Intravenous  Anesthetic plan and risks discussed with: Patient and Family      GA/LMA

## 2017-12-18 NOTE — PERIOP NOTES
TRANSFER - OUT REPORT:    Verbal report given to Bethany Archer RN (name) on Wilton Velazquez  being transferred to 2 S (unit) for routine progression of care       Report consisted of patients Situation, Background, Assessment and   Recommendations(SBAR). Information from the following report(s) SBAR, Kardex, Procedure Summary and Intake/Output was reviewed with the receiving nurse. Lines:   Peripheral IV 12/18/17 Left Forearm (Active)   Site Assessment Clean, dry, & intact 12/18/2017 11:19 AM   Phlebitis Assessment 0 12/18/2017 11:19 AM   Infiltration Assessment 0 12/18/2017 11:19 AM   Dressing Status Clean, dry, & intact 12/18/2017 11:19 AM   Dressing Type Transparent;Tape 12/18/2017 11:19 AM   Hub Color/Line Status Infusing 12/18/2017 11:19 AM        Opportunity for questions and clarification was provided.       Patient transported with:   O2 @ 2 liters  Registered Nurse

## 2017-12-18 NOTE — ROUTINE PROCESS
TRANSFER - IN REPORT:    Verbal report received from [facility] AGAPITORN(name) on Tigre John  being received from [facility]) for routine post - op      Report consisted of patients Situation, Background, Assessment and   Recommendations(SBAR). Information from the following report(s) SBAR, Kardex, Intake/Output, MAR and Accordion was reviewed with the receiving nurse. Opportunity for questions and clarification was provided.

## 2017-12-18 NOTE — PROGRESS NOTES
conducted a visit with Marsha Krabbe, who is a 71 y.o.,male. The  provided the following Interventions:  Initiated a relationship of care and support. Offered prayer and assurance of continued prayers on patient's behalf. Plan:  Chaplains will continue to follow and will provide pastoral care on an as needed/requested basis.  recommends bedside caregivers page  on duty if patient shows signs of acute spiritual or emotional distress. Rev. MARIE Sky.Div. Spiritual Care Dept.   [phone]

## 2017-12-18 NOTE — ANESTHESIA POSTPROCEDURE EVALUATION
Post-Anesthesia Evaluation & Assessment    Visit Vitals    /63    Pulse 75    Temp 36.4 °C (97.5 °F)    Resp 13    Ht 5' 5.75\" (1.67 m)    Wt 78.9 kg (174 lb)    SpO2 100%    BMI 28.3 kg/m2       Nausea/Vomiting: Controlled. Post-operative hydration adequate. Pain Scale 1: Numeric (0 - 10) (12/18/17 1058)  Pain Intensity 1: 0 (12/18/17 1058)   Managed    Pain score at or below stated goal level. Mental status & Level of consciousness: alert and oriented x 3    Neurological status: moves all extremities, sensation grossly intact    Pulmonary status: airway patent, adequate oxygenation. Complications related to anesthesia: none    Patient has met all PACU discharge requirements.       Bo Calvo DO

## 2017-12-18 NOTE — PROGRESS NOTES
Virtual reviewer communicated change to CM which reflect outpatient observation order written prior to discharge order being written. CM met with the patient and patients sister provided to the patient the printed information about his outpatient observation status. The patient was given the flyer entitled, \"Medicare Outpatient Observation: Information & notification. \" Pt and pts sister refused to sign form. Encouraged to call number on medicare form to address questions.

## 2017-12-18 NOTE — PROGRESS NOTES
1200 Received client from PACU in satisfactory condition. Client is A/O X 4. Client is calm and cooperative. . No numbness or tingling to the extremities. Skin is warm , dry and skin color is appropriate to race. Bibasilar breath sounds clear. Bowel sounds active . Abdomen is soft and non-tender. Client has not voided post-operatively. No bladder distention evident. No complaints of bladder discomfort. Client has a ABD pad dressing to the both knee with TEDS in place. Dressing is CDI. plexi applied bilaterally. Client has 18 gauge IV present in LFA and running LR. Clients pain is 0/10 on 0-10 scale. Client oriented to call bell use as well as bed use. Client oriented to phone and how to order meals. Call bell within reach. Bed in low position. Three side rails up. 1510 pt resting quietly in bed with sister on bed side. No pain , no complain and concern at this time     1830 pt sitting on side of bed   pt did not void yet, encouraged to drink fluid and informed about the straight catheterization if he will not able to void till 2000. Shift summary: Patient had uneventful shift. Patient ambulated with assistance using walker. Pain remained well-controlled with medication.  No issues/concerns at this time

## 2017-12-18 NOTE — PROGRESS NOTES
Problem: Falls - Risk of  Goal: *Absence of Falls  Document Rosana Fall Risk and appropriate interventions in the flowsheet.    Outcome: Progressing Towards Goal  Fall Risk Interventions:  Mobility Interventions: Assess mobility with egress test, Utilize walker, cane, or other assitive device, PT Consult for mobility concerns, Patient to call before getting OOB         Medication Interventions: Assess postural VS orthostatic hypotension, Teach patient to arise slowly, Patient to call before getting OOB    Elimination Interventions: Call light in reach, Elevated toilet seat, Toileting schedule/hourly rounds

## 2017-12-18 NOTE — H&P
History and Physical        Patient: Buck Wyatt               Sex: male          DOA: 12/18/2017         YOB: 1945      Age:  71 y.o.        LOS:  LOS: 0 days        HPI:     Buck Wyatt is a 71 y.o. male who has bilateral knee DJD has failed non-op therapy for yue med knee UKR    Past Medical History:   Diagnosis Date    Arthritis     DJD    BPH (benign prostatic hypertrophy)     Cataracts, bilateral     COPD     Dx with mild COPD- Ruled out by Dr. Rian Mcgovern [facility])     Ill-defined condition     dermatitis side of mouth    Lymphedema of lower extremity     right    Mild mental retardation     high functioning       Past Surgical History:   Procedure Laterality Date    HX GI  2005    colonoscopy    HX HERNIA REPAIR      right inguinal as a child    HX LUMBAR FUSION  2008    L2-3    HX LUMBAR FUSION  4-22-13    L2-5    HX MOHS PROCEDURES Left     HX ORTHOPAEDIC Right 2007    hip pinning    HX PROSTATECTOMY  8-1-13    Greenlight laser    HX SHOULDER REPLACEMENT Right 2012    right total shoulder    HX UROLOGICAL  8-1-2013    green light laser    SPINE SURGERY PROCEDURE UNLISTED         Family History   Problem Relation Age of Onset    Cancer Mother      colon cancer    Malignant Hyperthermia Neg Hx     Pseudocholinesterase Deficiency Neg Hx     Delayed Awakening Neg Hx     Post-op Nausea/Vomiting Neg Hx     Emergence Delirium Neg Hx     Post-op Cognitive Dysfunction Neg Hx        Social History     Social History    Marital status: SINGLE     Spouse name: N/A    Number of children: N/A    Years of education: N/A     Social History Main Topics    Smoking status: Former Smoker     Quit date: 7/25/2008    Smokeless tobacco: Never Used    Alcohol use Yes      Comment: 1 beer monthly    Drug use: No    Sexual activity: No     Other Topics Concern    Metallic Foreign Body Yes     rt hip and rt arm metal     Social History Narrative       Prior to Admission medications    Medication Sig Start Date End Date Taking? Authorizing Provider   ibuprofen (ADVIL) 200 mg tablet Take 200 mg by mouth every six (6) hours as needed for Pain. Yes Historical Provider   ascorbic acid, vitamin C, (VITAMIN C) 1,000 mg tablet Take 1,000 mg by mouth two (2) times daily (with meals). Yes Historical Provider   multivitamins-minerals-lutein (CENTRUM SILVER) Tab Take 1 Tab by mouth daily. Yes Historical Provider       Allergies   Allergen Reactions    Milk Other (comments)     MILK AND MILK PRODUCTS - SEVERE GALACTOSEMIA       Review of Systems  Pertinent items are noted in the History of Present Illness. Physical Exam:      Visit Vitals    /76 (BP 1 Location: Left arm, BP Patient Position: At rest;Pre-activity; Sitting)    Pulse 84    Temp 97.6 °F (36.4 °C)    Resp 18    Ht 5' 5.75\" (1.67 m)    Wt 78.9 kg (174 lb)    SpO2 97%    BMI 28.3 kg/m2       Physical Exam:  Physical Exam:   General:  Alert, cooperative, no distress, appears stated age. Eyes:  Conjunctivae/corneas clear. PERRL, EOMs intact. Fundi benign   Ears:  Normal TMs and external ear canals both ears. Nose: Nares normal. Septum midline. Mucosa normal. No drainage or sinus tenderness. Mouth/Throat: Lips, mucosa, and tongue normal. Teeth and gums normal.   Neck: Supple, symmetrical, trachea midline, no adenopathy, thyroid: no enlargement/tenderness/nodules, no carotid bruit and no JVD. Back:   Symmetric, no curvature. ROM normal. No CVA tenderness. Lungs:   Clear to auscultation bilaterally. Heart:  Regular rate and rhythm, S1, S2 normal, no murmur, click, rub or gallop. Abdomen:   Soft, non-tender. Bowel sounds normal. No masses,  No organomegaly. Extremities: Extremities normal, atraumatic, no cyanosis or edema. Knee varus def; pain med   Pulses: 2+ and symmetric all extremities.    Skin: Skin color, texture, turgor normal. No rashes or lesions   Lymph nodes: Cervical, supraclavicular, and axillary nodes normal.   Neurologic: CNII-XII intact. Normal strength, sensation and reflexes throughout. Labs Reviewed: All lab results for the last 24 hours reviewed.     Assessment/Plan   Active Problems:    DJD (degenerative joint disease) of knee (12/18/2017)        Romario knee med UKR

## 2017-12-19 PROBLEM — M17.9 DJD (DEGENERATIVE JOINT DISEASE) OF KNEE: Status: RESOLVED | Noted: 2017-12-18 | Resolved: 2017-12-19

## 2017-12-19 LAB
ANION GAP SERPL CALC-SCNC: 8 MMOL/L (ref 3–18)
BUN SERPL-MCNC: 17 MG/DL (ref 7–18)
BUN/CREAT SERPL: 21 (ref 12–20)
CALCIUM SERPL-MCNC: 9.2 MG/DL (ref 8.5–10.1)
CHLORIDE SERPL-SCNC: 104 MMOL/L (ref 100–108)
CO2 SERPL-SCNC: 28 MMOL/L (ref 21–32)
CREAT SERPL-MCNC: 0.8 MG/DL (ref 0.6–1.3)
GLUCOSE SERPL-MCNC: 117 MG/DL (ref 74–99)
HCT VFR BLD AUTO: 34.2 % (ref 36–48)
HGB BLD-MCNC: 11.2 G/DL (ref 13–16)
POTASSIUM SERPL-SCNC: 4.4 MMOL/L (ref 3.5–5.5)
SODIUM SERPL-SCNC: 140 MMOL/L (ref 136–145)

## 2017-12-19 PROCEDURE — 51798 US URINE CAPACITY MEASURE: CPT

## 2017-12-19 PROCEDURE — 65270000029 HC RM PRIVATE

## 2017-12-19 PROCEDURE — 77030034849

## 2017-12-19 PROCEDURE — 97110 THERAPEUTIC EXERCISES: CPT

## 2017-12-19 PROCEDURE — 97530 THERAPEUTIC ACTIVITIES: CPT

## 2017-12-19 PROCEDURE — 97166 OT EVAL MOD COMPLEX 45 MIN: CPT

## 2017-12-19 PROCEDURE — 97116 GAIT TRAINING THERAPY: CPT

## 2017-12-19 PROCEDURE — 97535 SELF CARE MNGMENT TRAINING: CPT

## 2017-12-19 PROCEDURE — 36415 COLL VENOUS BLD VENIPUNCTURE: CPT | Performed by: ORTHOPAEDIC SURGERY

## 2017-12-19 PROCEDURE — 85018 HEMOGLOBIN: CPT | Performed by: ORTHOPAEDIC SURGERY

## 2017-12-19 PROCEDURE — 74011250637 HC RX REV CODE- 250/637: Performed by: ORTHOPAEDIC SURGERY

## 2017-12-19 PROCEDURE — 74011250637 HC RX REV CODE- 250/637: Performed by: UROLOGY

## 2017-12-19 PROCEDURE — 77030012894

## 2017-12-19 PROCEDURE — 74011250636 HC RX REV CODE- 250/636: Performed by: ORTHOPAEDIC SURGERY

## 2017-12-19 PROCEDURE — 80048 BASIC METABOLIC PNL TOTAL CA: CPT | Performed by: ORTHOPAEDIC SURGERY

## 2017-12-19 PROCEDURE — 77030011943

## 2017-12-19 RX ORDER — TAMSULOSIN HYDROCHLORIDE 0.4 MG/1
0.4 CAPSULE ORAL DAILY
Qty: 30 CAP | Refills: 0 | Status: SHIPPED | OUTPATIENT
Start: 2017-12-19

## 2017-12-19 RX ORDER — TRAMADOL HYDROCHLORIDE 50 MG/1
50 TABLET ORAL 4 TIMES DAILY
Qty: 60 TAB | Refills: 0 | Status: SHIPPED | OUTPATIENT
Start: 2017-12-19

## 2017-12-19 RX ORDER — OXYCODONE HYDROCHLORIDE 5 MG/1
5 TABLET ORAL
Qty: 40 TAB | Refills: 0 | Status: SHIPPED | OUTPATIENT
Start: 2017-12-19

## 2017-12-19 RX ORDER — ENOXAPARIN SODIUM 100 MG/ML
40 INJECTION SUBCUTANEOUS DAILY
Qty: 14 SYRINGE | Refills: 0 | Status: SHIPPED | OUTPATIENT
Start: 2017-12-20

## 2017-12-19 RX ORDER — BISACODYL 5 MG
10 TABLET, DELAYED RELEASE (ENTERIC COATED) ORAL
Status: COMPLETED | OUTPATIENT
Start: 2017-12-19 | End: 2017-12-19

## 2017-12-19 RX ORDER — LEVOFLOXACIN 500 MG/1
500 TABLET, FILM COATED ORAL EVERY 24 HOURS
Status: COMPLETED | OUTPATIENT
Start: 2017-12-19 | End: 2017-12-19

## 2017-12-19 RX ORDER — ACETAMINOPHEN 325 MG/1
650 TABLET ORAL 4 TIMES DAILY
Qty: 120 TAB | Refills: 0 | Status: SHIPPED | OUTPATIENT
Start: 2017-12-19

## 2017-12-19 RX ORDER — OXYCODONE HCL 10 MG/1
10 TABLET, FILM COATED, EXTENDED RELEASE ORAL EVERY 12 HOURS
Qty: 14 TAB | Refills: 0 | Status: SHIPPED | OUTPATIENT
Start: 2017-12-19

## 2017-12-19 RX ADMIN — KETOROLAC TROMETHAMINE 15 MG: 15 INJECTION, SOLUTION INTRAMUSCULAR; INTRAVENOUS at 12:28

## 2017-12-19 RX ADMIN — Medication 1000 MG: at 10:37

## 2017-12-19 RX ADMIN — Medication 10 ML: at 13:13

## 2017-12-19 RX ADMIN — DOCUSATE SODIUM 100 MG: 100 CAPSULE, LIQUID FILLED ORAL at 20:55

## 2017-12-19 RX ADMIN — TRAMADOL HYDROCHLORIDE 50 MG: 50 TABLET, FILM COATED ORAL at 09:07

## 2017-12-19 RX ADMIN — Medication 10 ML: at 04:39

## 2017-12-19 RX ADMIN — BISACODYL 10 MG: 5 TABLET, COATED ORAL at 13:13

## 2017-12-19 RX ADMIN — Medication 5 ML: at 22:00

## 2017-12-19 RX ADMIN — ACETAMINOPHEN 650 MG: 325 TABLET ORAL at 21:40

## 2017-12-19 RX ADMIN — ENOXAPARIN SODIUM 40 MG: 40 INJECTION SUBCUTANEOUS at 09:07

## 2017-12-19 RX ADMIN — LEVOFLOXACIN 500 MG: 500 TABLET, FILM COATED ORAL at 21:40

## 2017-12-19 RX ADMIN — SODIUM CHLORIDE, SODIUM LACTATE, POTASSIUM CHLORIDE, AND CALCIUM CHLORIDE 75 ML/HR: 600; 310; 30; 20 INJECTION, SOLUTION INTRAVENOUS at 02:00

## 2017-12-19 RX ADMIN — MULTIPLE VITAMINS W/ MINERALS TAB 1 TABLET: TAB at 09:07

## 2017-12-19 RX ADMIN — Medication 1000 MG: at 17:30

## 2017-12-19 RX ADMIN — PREGABALIN 75 MG: 75 CAPSULE ORAL at 09:07

## 2017-12-19 RX ADMIN — TRAMADOL HYDROCHLORIDE 50 MG: 50 TABLET, FILM COATED ORAL at 21:40

## 2017-12-19 RX ADMIN — OXYCODONE HYDROCHLORIDE 10 MG: 10 TABLET, FILM COATED, EXTENDED RELEASE ORAL at 09:07

## 2017-12-19 RX ADMIN — ACETAMINOPHEN 650 MG: 325 TABLET ORAL at 03:41

## 2017-12-19 RX ADMIN — KETOROLAC TROMETHAMINE 15 MG: 15 INJECTION, SOLUTION INTRAMUSCULAR; INTRAVENOUS at 05:18

## 2017-12-19 RX ADMIN — DOCUSATE SODIUM 100 MG: 100 CAPSULE, LIQUID FILLED ORAL at 09:07

## 2017-12-19 RX ADMIN — ACETAMINOPHEN 650 MG: 325 TABLET ORAL at 17:30

## 2017-12-19 RX ADMIN — ACETAMINOPHEN 650 MG: 325 TABLET ORAL at 09:07

## 2017-12-19 RX ADMIN — TRAMADOL HYDROCHLORIDE 50 MG: 50 TABLET, FILM COATED ORAL at 17:30

## 2017-12-19 RX ADMIN — OXYCODONE HYDROCHLORIDE 10 MG: 10 TABLET, FILM COATED, EXTENDED RELEASE ORAL at 20:55

## 2017-12-19 RX ADMIN — TRAMADOL HYDROCHLORIDE 50 MG: 50 TABLET, FILM COATED ORAL at 12:29

## 2017-12-19 RX ADMIN — PREGABALIN 75 MG: 75 CAPSULE ORAL at 20:55

## 2017-12-19 NOTE — PROGRESS NOTES
Patient was visited by [facility]. Volunteer conducted a Spiritual Care Screening and reported no needs to this . Spiritual Care literature was provided. Chaplains will continue to follow and will provide pastoral care as needed or requested. [address], M.Div.   Board Certified   [phone] - Office

## 2017-12-19 NOTE — PROGRESS NOTES
Patient was visited by DARIANA. Volunteer followed up with patient and/or family and reported no needs to this . Chaplains will continue to follow and will provide pastoral care as needed or requested. [address], M.Div.   Board Certified   [phone] - Office

## 2017-12-19 NOTE — PROGRESS NOTES
Problem: Falls - Risk of  Goal: *Absence of Falls  Document Rosana Fall Risk and appropriate interventions in the flowsheet.    Outcome: Progressing Towards Goal  Fall Risk Interventions:  Mobility Interventions: Patient to call before getting OOB         Medication Interventions: Assess postural VS orthostatic hypotension, Patient to call before getting OOB, Teach patient to arise slowly    Elimination Interventions: Call light in reach, Patient to call for help with toileting needs, Urinal in reach

## 2017-12-19 NOTE — PROGRESS NOTES
Problem: Falls - Risk of  Goal: *Absence of Falls  Document Rosana Fall Risk and appropriate interventions in the flowsheet.    Outcome: Progressing Towards Goal  Fall Risk Interventions:  Mobility Interventions: Assess mobility with egress test, OT consult for ADLs, Patient to call before getting OOB, PT Consult for mobility concerns, Utilize walker, cane, or other assitive device         Medication Interventions: Assess postural VS orthostatic hypotension, Patient to call before getting OOB, Teach patient to arise slowly    Elimination Interventions: Call light in reach, Elevated toilet seat, Urinal in reach

## 2017-12-19 NOTE — CONSULTS
[facility], [facility]. Urology    Subjective:     Date of Consultation:  December 19, 2017    Referring Physician: Rahel Sun MD    Reason for Consultation:  Acute post op urinary retention    History of Present Illness:   Patient is a 71 y.o.  male who is being seen for  Acute post op urinary retention. He was admitted to the hospital for [address]  DJD (degenerative joint disease) of knee  DJD (degenerative joint disease) of knee  Senile arthritis  DJD (degenerative joint disease). S/p BILATERAL UNI KNEES-MEDIAL W/CONFORMIS POD 1. Pt w/ hx of BPH with urinary retention after above procedure. Pt's Urologist is Dr. America Jenkins. Bowers cath has been placed.      BMP: No results found for: NA, K, CL, CO2, AGAP, GLU, BUN, CREA, GFRAA, GFRNA  CMP: No results found for: NA, K, CL, CO2, AGAP, GLU, BUN, CREA, GFRAA, GFRNA, CA, MG, PHOS, ALB, TBIL, TP, ALB, GLOB, AGRAT, SGOT, ALT, GPT  CBC: No results found for: WBC, HGB, HGBEXT, HCT, HCTEXT, PLT, PLTEXT, HGBEXT, HCTEXT, PLTEXT          Past Medical History:   Diagnosis Date    Arthritis     DJD    BPH (benign prostatic hypertrophy)     Cataracts, bilateral     COPD     Dx with mild COPD- Ruled out by Dr. Li [facility])     Ill-defined condition     dermatitis side of mouth    Lymphedema of lower extremity     right    Mild mental retardation     high functioning      Past Surgical History:   Procedure Laterality Date    HX GI  2005    colonoscopy    HX HERNIA REPAIR      right inguinal as a child    HX LUMBAR FUSION  2008    L2-3    HX LUMBAR FUSION  4-22-13    L2-5    HX MOHS PROCEDURES Left     HX ORTHOPAEDIC Right 2007    hip pinning    HX PROSTATECTOMY  8-1-13    Greenlight laser    HX SHOULDER REPLACEMENT Right 2012    right total shoulder    HX UROLOGICAL  8-1-2013    green light laser    SPINE SURGERY PROCEDURE UNLISTED        Family History   Problem Relation Age of Onset    Cancer Mother      colon cancer  Malignant Hyperthermia Neg Hx     Pseudocholinesterase Deficiency Neg Hx     Delayed Awakening Neg Hx     Post-op Nausea/Vomiting Neg Hx     Emergence Delirium Neg Hx     Post-op Cognitive Dysfunction Neg Hx       Social History   Substance Use Topics    Smoking status: Former Smoker     Quit date: 7/25/2008    Smokeless tobacco: Never Used    Alcohol use Yes      Comment: 1 beer monthly     Allergies   Allergen Reactions    Milk Other (comments)     MILK AND MILK PRODUCTS - SEVERE GALACTOSEMIA      Prior to Admission medications    Medication Sig Start Date End Date Taking? Authorizing Provider   acetaminophen (TYLENOL) 325 mg tablet Take 2 Tabs by mouth four (4) times daily. 12/19/17  Yes Elaine Coy MD   enoxaparin (LOVENOX) 40 mg/0.4 mL 0.4 mL by SubCUTAneous route daily. 12/20/17  Yes Elaine Coy MD   oxyCODONE ER (OXYCONTIN) 10 mg ER tablet Take 1 Tab by mouth every twelve (12) hours. Max Daily Amount: 20 mg. 12/19/17  Yes Elaine Coy MD   oxyCODONE IR (ROXICODONE) 5 mg immediate release tablet Take 1 Tab by mouth every four (4) hours as needed. Max Daily Amount: 30 mg. 12/19/17  Yes Elaine Coy MD   traMADol Ple[place] Dasen) 50 mg tablet Take 1 Tab by mouth four (4) times daily. Max Daily Amount: 200 mg. 12/19/17  Yes Elaine Coy MD   ibuprofen (ADVIL) 200 mg tablet Take 200 mg by mouth every six (6) hours as needed for Pain. Yes Historical Provider   ascorbic acid, vitamin C, (VITAMIN C) 1,000 mg tablet Take 1,000 mg by mouth two (2) times daily (with meals). Yes Historical Provider   multivitamins-minerals-lutein (CENTRUM SILVER) Tab Take 1 Tab by mouth daily. Yes Historical Provider         REVIEW OF SYSTEMS  System Neg/Pos Details   Constitutional Negative Chills and fever. ENMT Negative Ear infections and sore throat. Eyes Negative Blurred vision, double vision and eye pain. Respiratory Negative Asthma, chronic cough, dyspnea and wheezing.    Cardio Negative Chest pain.   GI Neg Constipation. GI Negative Decreased appetite, diarrhea, nausea and vomiting.  Positive Incomplete emptying, Intermittent urinary stream, Slow stream, Urinary straining.  Negative Dysuria, hematuria, pelvic pain, pelvic pressure, pressure, suprapubic pain, urgency, urinary dribbling, urinary frequency and urinary incontinence. Endocrine Negative Cold intolerance, heat intolerance, increased thirst and weight loss. Neuro Negative Headache and tremors. Psych Negative Anxiety and depression. Integumentary Negative Itching skin and rash. MS Negative Back pain and joint pain. Hema/Lymph Negative Easy bleeding. Objective:     Patient Vitals for the past 8 hrs:   BP Temp Pulse Resp SpO2   17 1526 113/57 97.7 °F (36.5 °C) 76 16 100 %     Temp (24hrs), Av.6 °F (36.4 °C), Min:97.4 °F (36.3 °C), Max:97.7 °F (36.5 °C)    Intake and Output:    1901 -  0700  In: 9649 [P.O.:1680; I.V.:3230]  Out: 2100 [Urine:2000]    Physical Exam:  Constitutional Normal Well developed. Neck Exam Normal Inspection - Normal. Palpation - Normal. Thyroid gland - Normal.   Respiratory Normal Inspection - No labored breathing   Abdomen Normal No abdominal tenderness. No hepatic enlargement. No splenic enlargement. No hernia. Genitourinary Normal No Suprapubic tenderness. No CVA tenderness. No Flank mass  Pisano cath in place   Skin Normal Inspection - Normal.   Musculoskeletal Normal Gait - limited   Extremity Normal No Edema. Neurological Normal Level of consciousness - Normal. Orientation - Normal.   Psychiatric Normal Oriented to time, place, person and situation. Appropriate mood and affect.          Assessment:     Active Problems:    Senile arthritis (2017)      DJD (degenerative joint disease) (2017)      Acute post op urinary retention  BPH with urinary retention      Plan:   -cont pisano catheter.  -resume flomax   -Pt can be discharged to rehab tomorrow with pisano catheter. He will need voiding trial in 1 week. Bowers cath be removed at rehab at 00:01 am the day of follow for voiding trial with [facility] Dr. Radha Lockwood office in 1 week.        Signed By: Maria Luisa Mejias MD                         December 19, 2017

## 2017-12-19 NOTE — ROUTINE PROCESS
Bedside shift change report given to Comfort Oliveira (oncoming nurse) by Tian CORRALES RN (offgoing nurse). Report included the following information SBAR, Kardex and MAR.

## 2017-12-19 NOTE — PROGRESS NOTES
Problem: Mobility Impaired (Adult and Pediatric)  Goal: *Acute Goals and Plan of Care (Insert Text)  Physical Therapy Goals LT/ST  Initiated 12/18/2017 and to be accomplished within 3-5 day(s)  1. Patient will move from supine <> sit with S in prep for out of bed activity and change of position. 2.  Patient will perform sit<> stand with S with LRAD in prep for transfers/ambulation. 3.  Patient will transfer from bed <> chair with S with LRAD for time up in chair for completion of ADL activity. 4.  Patient will ambulate 150 feet with LRAD/S for improved functional mobility/safe discharge. 5.  Patient will ascend/descend  > 1 step with minimal assistance/contact guard assist for home re-entry as needed. Outcome: Progressing Towards Goal  physical Therapy TREATMENT    Patient: Marsha Krabbe (18 y.o. male)  Date: 12/19/2017  Diagnosis: KNEE DEGENERATIVE JOINT DISEASE, GALACTOSEMIA  DJD (degenerative joint disease) of knee  DJD (degenerative joint disease) of knee  Senile arthritis  DJD (degenerative joint disease) <principal problem not specified>  Procedure(s) (LRB):  BILATERAL UNI KNEES-MEDIAL W/CONFORMIS (Bilateral) 1 Day Post-Op  Precautions: Fall, WBAT   Chart, physical therapy assessment, plan of care and goals were reviewed. ASSESSMENT:  Patient agreeable to participate. Pt continues to require assistance with transfers. Extensive VCs for sequencing and correct RW management/ safety. Pt ambulating 40' with RW CGA. Pt unable to progress to reciprocal gait at this time. Reviewed supine HEP. Pt reporting no increase in pain, however pt grimacing. Family present and mention pt may not understand that he is actually in pain due to mental status. Pt left supine in bed with ice to B knees, and SCDs in place. Cont POC.         Progression toward goals:  []      Improving appropriately and progressing toward goals  [x]      Improving slowly and progressing toward goals  []      Not making progress toward goals and plan of care will be adjusted     PLAN:  Patient continues to benefit from skilled intervention to address the above impairments. Continue treatment per established plan of care. Discharge Recommendations:  Rehab  Further Equipment Recommendations for Discharge:  rolling walker     SUBJECTIVE:   Patient stated  I keep forgetting to keep the walker down      OBJECTIVE DATA SUMMARY:   Critical Behavior:  Neurologic State: Alert, Appropriate for age  Orientation Level: Oriented X4  Cognition: Appropriate decision making, Appropriate for age attention/concentration, Appropriate safety awareness, Follows commands  Safety/Judgement: Awareness of environment, Fall prevention  Functional Mobility Training:  Bed Mobility:  Supine to Sit: Contact guard assistance  Sit to Supine: Stand-by asssistance  Transfers:  Sit to Stand: Minimum assistance  Stand to Sit: Minimum assistance  Balance:  Sitting: Intact  Standing: Intact; With support  Standing - Static: Fair  Ambulation/Gait Training:  Distance (ft): 40 Feet (ft)  Assistive Device: Walker, rolling;Gait belt  Ambulation - Level of Assistance: Contact guard assistance  Gait Abnormalities: Decreased step clearance; Step to gait  Right Side Weight Bearing: As tolerated  Left Side Weight Bearing: As tolerated  Base of Support: Narrowed  Speed/Trang: Shuffled; Slow  Step Length: Right shortened;Left shortened  Swing Pattern: Left asymmetrical;Right asymmetrical    Therapeutic Exercises:       EXERCISE   Sets   Reps   Active Active Assist   Passive Self ROM   Comments   Ankle Pumps 2 10  [x] [] [] []    Quad Sets/Glut Sets 2 10 [x] [] [] []    Hamstring Sets   [] [] [] []    Short Arc Quads   [] [] [] []    Heel Slides 2 10 [x] [] [] []    Straight Leg Raises   [] [] [] []    Hip Abd/Add   [] [] [] []    Long Arc Quads   [] [] [] []    Seated Marching   [] [] [] []    Standing Marching   [] [] [] []       [] [] [] []        Pain:  Pain Scale 1: Numeric (0 - 10)  Pain Intensity 1: 0  Activity Tolerance:   Fair     After treatment:   [] Patient left in no apparent distress sitting up in chair  [x] Patient left in no apparent distress in bed  [x] Call bell left within reach  [x] Nursing notified  [x] Caregiver present  [] Bed alarm activated      Saundra Aquino PTA   Time Calculation: 38 mins

## 2017-12-19 NOTE — PROGRESS NOTES
conducted a Follow up consultation and Spiritual Assessment for Ambar Schwartz, who is a 71 y.o.,male. Continued the relationship of care and support. Listened empathically. Offered prayer and assurance of continued prayer on patients behalf. Plan:  Chaplains will continue to follow and will provide pastoral care as needed or requested.  recommends bedside caregivers page the  on duty if patient shows signs of acute spiritual or emotional distress. Father SAVANNA St[employer].   [address] [facility] - Office

## 2017-12-19 NOTE — DISCHARGE SUMMARY
Discharge Summary    Patient: Abel Briscoe               Sex: male          DOA: 12/18/2017         YOB: 1945      Age:  71 y.o.        LOS:  LOS: 1 day                Admit Date: 12/18/2017    Discharge Date: 12/19/2017    Admission Diagnoses: KNEE DEGENERATIVE JOINT DISEASE, GALACTOSEMIA  DJD (degenerative joint disease) of knee  DJD (degenerative joint disease) of knee  Senile arthritis  DJD (degenerative joint disease)    Discharge Diagnoses:    Problem List as of 12/19/2017  Date Reviewed: 12/18/2017          Codes Class Noted - Resolved    Senile arthritis ICD-10-CM: M19.90  ICD-9-CM: 715.90  12/18/2017 - Present        DJD (degenerative joint disease) ICD-10-CM: M19.90  ICD-9-CM: 715.90  12/18/2017 - Present        Closed left hip fracture ([facility] ICD-10-CM: R21.009Q  ICD-9-CM: 820.8  8/22/2017 - Present        Galactosemia ([facility] ICD-10-CM: V88.97  ICD-9-CM: 271.1  Unknown - Present        Rotator cuff syndrome of left shoulder (Chronic) ICD-10-CM: M75.102  ICD-9-CM: 726.10  9/8/2013 - Present        Spondylolisthesis of lumbar region ICD-10-CM: M43.16  ICD-9-CM: 738.4  4/15/2013 - Present        DDD (degenerative disc disease), lumbar ICD-10-CM: M51.36  ICD-9-CM: 722.52  4/15/2013 - Present        S/P lumbar spinal fusion ICD-10-CM: Z98.1  ICD-9-CM: V45.4  4/15/2013 - Present        Mild mental retardation ICD-10-CM: F70  ICD-9-CM: 831  Unknown - Present        Fall ICD-10-CM: W19. Louie Arts  ICD-9-CM: E888.9  7/25/2012 - Present        OA (osteoarthritis) ICD-10-CM: M19.90  ICD-9-CM: 715.90  7/25/2012 - Present    Overview Signed 7/25/2012 12:22 PM by Kaitlin Ortega MD     HIPS             RESOLVED: DJD (degenerative joint disease) of knee ICD-10-CM: M17.10  ICD-9-CM: 715.36  12/18/2017 - 12/18/2017        RESOLVED: DJD (degenerative joint disease) of knee ICD-10-CM: M17.10  ICD-9-CM: 715.36  12/18/2017 - 12/19/2017        RESOLVED: Spinal stenosis, lumbar ICD-10-CM: M48.061  ICD-9-CM: 724.02  4/15/2013 - 4/24/2013              Discharge Condition: Good    Hospital Course: [facility] TKR    Consults: None    Significant Diagnostic Studies: none    Discharge Medications:     Current Discharge Medication List      START taking these medications    Details   acetaminophen (TYLENOL) 325 mg tablet Take 2 Tabs by mouth four (4) times daily. Qty: 120 Tab, Refills: 0      enoxaparin (LOVENOX) 40 mg/0.4 mL 0.4 mL by SubCUTAneous route daily. Qty: 14 Syringe, Refills: 0      oxyCODONE ER (OXYCONTIN) 10 mg ER tablet Take 1 Tab by mouth every twelve (12) hours. Max Daily Amount: 20 mg.  Qty: 14 Tab, Refills: 0      oxyCODONE IR (ROXICODONE) 5 mg immediate release tablet Take 1 Tab by mouth every four (4) hours as needed. Max Daily Amount: 30 mg.  Qty: 40 Tab, Refills: 0      traMADol (ULTRAM) 50 mg tablet Take 1 Tab by mouth four (4) times daily. Max Daily Amount: 200 mg. Qty: 60 Tab, Refills: 0         CONTINUE these medications which have NOT CHANGED    Details   ascorbic acid, vitamin C, (VITAMIN C) 1,000 mg tablet Take 1,000 mg by mouth two (2) times daily (with meals). multivitamins-minerals-lutein (CENTRUM SILVER) Tab Take 1 Tab by mouth daily.          STOP taking these medications       ibuprofen (ADVIL) 200 mg tablet Comments:   Reason for Stopping:               Activity: Activity as tolerated    Diet: Regular Diet    Wound Care: Keep wound clean and dry    Follow-up: 2 weeks

## 2017-12-19 NOTE — PROGRESS NOTES
20:00  Assessment completed. Lungs are clear bilat. ABD dsg's on knees remain D/I with quarter size shadows present on each. + CMS & + PP. Denies pain or discomfort in calves. Resting quietly in bed with hie sister @ bedside. visiting. .     21:00 Attempted to void per urinal w/o success, bladder scanned for 900mls, st cath'd for 1000 mls of clear yellow urine. 22:45 Shift assessment completed. See nsg flow sheet for details. 02:55 Reassessed with 0 changes noted. Dsg's on knees remain D/I with 0 change in shadow size. CMS & PP remain intact. Resting quietly in bed with eyes closed between cares. 04:45 Bladder scanned & st cath'd for 1000 mls of clear yellow urine. 07:10 Bedside and Verbal shift change report given to Brittany Robbins RN (oncoming nurse) by Izzy Klein RN (offgoing nurse). Report included the following information SBAR.

## 2017-12-19 NOTE — PROGRESS NOTES
3483 Assumed care of pt at this time. Pt in bed with no signs of distress. Pt left with call light within reach and encouraged to call for assistance. 0422 Head to toe assessment completed at this time  Patient is A&OX4. Pt denies N/V chest pain and SOB or distress. Pt is calm and cooperative. Pedal pulses are present. Capillary refill less than 3 seconds. Skin in warm and dry with ABD pad dressing on both knee and has old drainage on both dressing. Lungs are clear bilaterally. Patient instructed on use and reason for incentive spirometer. Bowel sounds are active. Abdomen is soft and non-tender. XANDER & plexi in place bilaterally . Positive dorsalis pedis pulse, sensation, warm. No tingling or numbness to lower extremities. 18 g needle in the LFA. Pain scale explained to patient. Reasons for taking PRN meds explained to patient. Patient instructed to call for prn when needed. Pain level is 0. Patient was oriented to call bell and bed function. Will continue to monitor    32 61 16 called dr office and talked with dr john regarding pt retained urine so did straight cath and it was 1500 ml , if he want to do foleys and  consult for urologist. Dr Rojo Cords state to start the foleys catheter and remove that tomorrow morning and even after that he will not able to void he will go rehab with foleys and put the consult for urologist Dr Troy Davis    [phone] called office for dr Valeria Stuart at this time for consult     PT informed there is some active bleeding on dressing so reinforced the dressing at this time. [address] talked with Dr Valeria Stuart, urologist and informed him about the pt condition, he stated to start foleys and he can go rehab with foleys and he will see pt today if not tomorrow morning.     1830 attempt to do foleys unsuccessful    1900 attempt to do foleys by Erika Middleton again unsuccessful    1930 paged dr Arnaud coates called back and asked to insert straight cath    2000 attempt to do straight cath and was unsucceessful    2010 called DR hernandez and let him know about unsuccessful of staight catheter and he said ready the urology cart and he will be here. 2045 dr Juliann Mabry placed coude catheter on him , pt left in comfortable position. No complain and concern at this time.

## 2017-12-19 NOTE — PROGRESS NOTES
Problem: Self Care Deficits Care Plan (Adult)  Goal: *Acute Goals and Plan of Care (Insert Text)  In 2-3 visits,     1. Patient will complete LB dressing with set up/S in preparation for d/c   2. Patient will transfer on/off toilet with S for toileting in preparation for d/c   3. Patient will complete grooming tasks standing at sink with set up/S in preparation for d/c     Dutch Freeze, OTR/L    Outcome: Progressing Towards Goal  Occupational Therapy EVALUATION    Patient: Bethany Richards (42 y.o. male)  Date: 12/19/2017  Primary Diagnosis: KNEE DEGENERATIVE JOINT DISEASE, GALACTOSEMIA  DJD (degenerative joint disease) of knee  DJD (degenerative joint disease) of knee  Senile arthritis  DJD (degenerative joint disease)  Procedure(s) (LRB):  BILATERAL UNI KNEES-MEDIAL W/CONFORMIS (Bilateral) 1 Day Post-Op   Precautions:  Fall, WBAT    ASSESSMENT :  Based on the objective data described below, the patient presents with decreased independence with LB ADL completion and ADL related mobility following B parital knee replacements. Pt lives alone and has limited support of family members due to h/o mild MR. Presently, pt demo CGA with supine to sit transfer in preparation for ADL. Pt functioning at [address] A level overall for sit to stand transfers and LB ADL completion. Pt has lift chair at home and was using adaptive equipment (reacher, sockaid and LH shoehorn) to assist with LB dressing prior to surgery. Pt tolerated standing at sink to brush teeth with SBA. Min A for toilet transfers with 3n1 commode in place. Recommend rehab to maximize pt safety and [place] with ADL/IADL completion due at d/c secondary to pt lives alone. Patient will benefit from skilled intervention to address the above impairments.    Patients rehabilitation potential is considered to be Good  Factors which may influence rehabilitation potential include:   []             None noted  [x]             Mental ability/status  [] Medical condition  []             Home/family situation and support systems  []             Safety awareness  []             Pain tolerance/management  []             Other:        PLAN :  Recommendations and Planned Interventions:  [x]               Self Care Training                  [x]        Therapeutic Activities  [x]               Functional Mobility Training    []        Cognitive Retraining  []               Therapeutic Exercises           []        Endurance Activities  [x]               Balance Training                   []        Neuromuscular Re-Education  []               Visual/Perceptual Training     [x]   Home Safety Training  [x]               Patient Education                 [x]        Family Training/Education  []               Other (comment):    Frequency/Duration: Patient will be followed by occupational therapy daily to address goals. Discharge Recommendations: Rehab  Further Equipment Recommendations for Discharge: N/A     SUBJECTIVE:   Patient stated I want to walk.     OBJECTIVE DATA SUMMARY:     Past Medical History:   Diagnosis Date    Arthritis     DJD    BPH (benign prostatic hypertrophy)     Cataracts, bilateral     COPD     Dx with mild COPD- Ruled out by Dr[facility] Jesse Sol[facility])     Ill-defined condition     dermatitis side of mouth    Lymphedema of lower extremity     right    Mild mental retardation     high functioning     Past Surgical History:   Procedure Laterality Date    HX GI  2005    colonoscopy    HX HERNIA REPAIR      right inguinal as a child    HX LUMBAR FUSION  2008    L2-3    HX LUMBAR FUSION  4-22-13    L2-5    HX MOHS PROCEDURES Left     HX ORTHOPAEDIC Right 2007    hip pinning    HX PROSTATECTOMY  8-1-13    Greenlight laser    HX SHOULDER REPLACEMENT Right 2012    right total shoulder    HX UROLOGICAL  8-1-2013    green light laser    SPINE SURGERY PROCEDURE UNLISTED       Barriers to Learning/Limitations: cognitive barriers  Compensate with: visual, verbal, tactile, kinesthetic cues/model  Prior Level of Function/Home Situation:   Home Situation  Home Environment: Private residence  Wheelchair Ramp: Yes  One/Two Story Residence: One story  Living Alone: Yes  Support Systems: Family member(s)  Patient Expects to be Discharged to[de-identified] Rehabilitation facility  Current DME Used/Available at Home: Adaptive dressing aides, Commode, bedside, Lift chair, Shower chair, Walker, rolling  Tub or Shower Type: Tub/Shower combination  [x]  Right hand dominant   []  Left hand dominant  Cognitive/Behavioral Status:  Neurologic State: Alert; Appropriate for age  Orientation Level: Oriented X4  Cognition: Appropriate decision making; Appropriate for age attention/concentration; Appropriate safety awareness; Follows commands     Skin: dressing intact yue knees  Edema: B LE  Vision/Perceptual:         Corrective Lenses: Glasses  Coordination:  WFL  Balance:  Sitting: Intact  Standing: Intact; With support  Standing - Static: Fair  Strength: WFL B UE  Tone & Sensation: Normal tone  Range of Motion:  WFL B UE  Functional Mobility and Transfers for ADLs:  Bed Mobility:     Supine to Sit: Contact guard assistance     Transfers:  Sit to Stand: Minimum assistance from elevated surfaces      Toilet Transfer : Minimum assistance           Bathroom Mobility: Contact guard assistance  ADL Assessment:     Oral Facial Hygiene/Grooming: Stand-by assistance  Bathing: Minimum assistance  Upper Body Dressing: Setup  Lower Body Dressing: Minimum assistance  Toileting: Contact guard assistance     ADL Intervention:     Grooming  Brushing Teeth: Stand-by assistance standing at sink        Upper Body Dressing Assistance  Pullover Shirt: Supervision/set-up  Lower Body Dressing Assistance  Pants With Elastic Waist: Minimum assistance  Adaptive Equipment Used:  Other(comment) (without AE; pt using AE for LB dressing previously)  Toileting  Toileting Assistance: Contact guard assistance       Pain:  Pre-treatment: 0/10  Post-treatment: 0/10    Activity Tolerance: Tolerated OT session well standing at sink ~3-5 minutes for grooming   Please refer to the flowsheet for vital signs taken during this treatment. After treatment:   [] Patient left in no apparent distress sitting up in chair  [] Patient left in no apparent distress in bed  [] Call bell left within reach  [] Nursing notified  [x] Caregiver present  [] Bed alarm activated    COMMUNICATION/EDUCATION:    [x] Home safety education was provided and the patient/caregiver indicated understanding. [x] Patient/family have participated as able in goal setting and plan of care. [x] Patient/family agree to work toward stated goals and plan of care. [] Patient understands intent and goals of therapy, but is neutral about his/her participation. [] Patient is unable to participate in goal setting and plan of care. Thank you for this referral.  Shirley Tolentino, OTR/L  Time Calculation: 34 mins    G-Codes (GP)  Self Care   Current  CJ= 20-39%   Goal  CI= 1-19%  The severity rating is based on the professional judgement & direct observation of Level of Assistance required for Functional Mobility and ADLs. Eval Complexity: History: LOW Complexity : Brief history review ; Examination: MEDIUM Complexity : 3-5 performance deficits relating to physical, cognitive , or psychosocial skils that result in activity limitations and / or participation restrictions; Decision Making:MEDIUM Complexity : Patient may present with comorbidities that affect occupational performnce.  Miniml to moderate modification of tasks or assistance (eg, physical or verbal ) with assesment(s) is necessary to enable patient to complete evaluation

## 2017-12-19 NOTE — PROGRESS NOTES
Plan: pt has been booked for admission to [facility] [address] [place] [address] for Wednesday 12/20. RN please call report to [phone]. Please include all hard scripts for controlled substances, med rec and dc summary in packet. Please medicate for pain prior to dc if possible and needed to help offset delay when patient first arrives to facility. Pt will need medical transport for safety, sister is aware pt may incur cost for transport. Care Management Interventions  PCP Verified by CM:  Yes  Transition of Care Consult (CM Consult): Discharge Planning  Discharge Durable Medical Equipment: No  Physical Therapy Consult: Yes  Occupational Therapy Consult: Yes  Current Support Network: Family Lives Nearby, Lives Alone  Confirm Follow Up Transport: Family  Plan discussed with Pt/Family/Caregiver: Yes  Freedom of Choice Offered: Yes  Discharge Location  Discharge Placement: Rehab hospital/unit acute

## 2017-12-19 NOTE — PHYSICIAN ADVISORY
Letter of Status Determination: Agree with Inpatient Status    UPDATE:    Vandana Eckert was hospitalized on 12/18/2017 for planned surgical intervention. His hospital stay has already crossed one medically necessary midnight. Postoperatively, he developed marked urinary retention requiring repeated urinary catheterization. On day 2, ongoing hospitalization is warranted for this frail patient with poor baseline neurologic status, transient hypotension, urinary retention, requiring multiple doses of IV analgesics, with current plans including continued close clinical monitoring. Therefore, we agree with INPATIENT status. The final decision regarding the patient's hospitalization status depends on the attending physician's judgment.        Angle Ramírez MD, RAMANDEEP, [address] [facility]  Physician Matty Taylor.  [phone]    December 19, 2017   1:28 PM

## 2017-12-19 NOTE — PROGRESS NOTES
Problem: Mobility Impaired (Adult and Pediatric)  Goal: *Acute Goals and Plan of Care (Insert Text)  Physical Therapy Goals LT/ST  Initiated 12/18/2017 and to be accomplished within 3-5 day(s)  1. Patient will move from supine <> sit with S in prep for out of bed activity and change of position. 2.  Patient will perform sit<> stand with S with LRAD in prep for transfers/ambulation. 3.  Patient will transfer from bed <> chair with S with LRAD for time up in chair for completion of ADL activity. 4.  Patient will ambulate 150 feet with LRAD/S for improved functional mobility/safe discharge. 5.  Patient will ascend/descend  > 1 step with minimal assistance/contact guard assist for home re-entry as needed. Outcome: Progressing Towards Goal  physical Therapy TREATMENT    Patient: Mikayla Henderson (7 y.o. male)  Date: 12/19/2017  Diagnosis: KNEE DEGENERATIVE JOINT DISEASE, GALACTOSEMIA  DJD (degenerative joint disease) of knee  DJD (degenerative joint disease) of knee  Senile arthritis  DJD (degenerative joint disease) <principal problem not specified>  Procedure(s) (LRB):  BILATERAL UNI KNEES-MEDIAL W/CONFORMIS (Bilateral) 1 Day Post-Op  Precautions: Fall, WBAT   Chart, physical therapy assessment, plan of care and goals were reviewed. ASSESSMENT:  Pt continues to have most difficulty with transfers. Mod A required. VCs for correct hand placement and sequencing. Pt ambulating 40' with RW CGA. Bleeding noted to L knee. Nursing aware, and in to reinforce. Pt may require medical transport to rehab facility due to quality of transfers. Cont POC. Progression toward goals:  []      Improving appropriately and progressing toward goals  [x]      Improving slowly and progressing toward goals  []      Not making progress toward goals and plan of care will be adjusted     PLAN:  Patient continues to benefit from skilled intervention to address the above impairments.   Continue treatment per established plan of care.  Discharge Recommendations:  Rehab  Further Equipment Recommendations for Discharge:  rolling walker     SUBJECTIVE:   Patient stated  I'm ready      OBJECTIVE DATA SUMMARY:   Critical Behavior:  Neurologic State: Alert, Appropriate for age  Orientation Level: Oriented X4  Cognition: Appropriate decision making, Appropriate for age attention/concentration, Appropriate safety awareness, Follows commands  Safety/Judgement: Awareness of environment, Fall prevention  Functional Mobility Training:  Bed Mobility:  Supine to Sit: Contact guard assistance  Sit to Supine: Stand-by asssistance  Transfers:  Sit to Stand: Moderate assistance  Stand to Sit: Minimum assistance  Balance:  Sitting: Intact  Standing: Intact; With support  Standing - Static: Fair  Standing - Dynamic : Fair  Ambulation/Gait Training:  Distance (ft): 40 Feet (ft)  Assistive Device: Walker, rolling;Gait belt  Ambulation - Level of Assistance: Contact guard assistance  Gait Abnormalities: Decreased step clearance  Right Side Weight Bearing: As tolerated  Left Side Weight Bearing: As tolerated  Base of Support: Narrowed  Speed/Trang: Slow;Shuffled  Step Length: Right shortened;Left shortened  Swing Pattern: Left asymmetrical;Right asymmetrical    Pain:  Pain Scale 1: Numeric (0 - 10)  Pain Intensity 1: 0  Activity Tolerance:   Fair     After treatment:   [] Patient left in no apparent distress sitting up in chair  [x] Patient left in no apparent distress in bed  [x] Call bell left within reach  [] Nursing notified  [] Caregiver present  [] Bed alarm activated      Martina Collazo PTA   Time Calculation: 25 mins

## 2017-12-20 VITALS
RESPIRATION RATE: 16 BRPM | DIASTOLIC BLOOD PRESSURE: 64 MMHG | TEMPERATURE: 98.1 F | BODY MASS INDEX: 27.97 KG/M2 | SYSTOLIC BLOOD PRESSURE: 123 MMHG | HEIGHT: 66 IN | HEART RATE: 100 BPM | OXYGEN SATURATION: 97 % | WEIGHT: 174 LBS

## 2017-12-20 PROCEDURE — 74011250637 HC RX REV CODE- 250/637: Performed by: ORTHOPAEDIC SURGERY

## 2017-12-20 PROCEDURE — 74011250636 HC RX REV CODE- 250/636: Performed by: ORTHOPAEDIC SURGERY

## 2017-12-20 RX ADMIN — ACETAMINOPHEN 650 MG: 325 TABLET ORAL at 04:59

## 2017-12-20 RX ADMIN — MULTIPLE VITAMINS W/ MINERALS TAB 1 TABLET: TAB at 09:05

## 2017-12-20 RX ADMIN — DOCUSATE SODIUM 100 MG: 100 CAPSULE, LIQUID FILLED ORAL at 09:05

## 2017-12-20 RX ADMIN — Medication 1000 MG: at 09:04

## 2017-12-20 RX ADMIN — ENOXAPARIN SODIUM 40 MG: 40 INJECTION SUBCUTANEOUS at 09:04

## 2017-12-20 RX ADMIN — PROCHLORPERAZINE EDISYLATE 5 MG: 5 INJECTION INTRAMUSCULAR; INTRAVENOUS at 12:15

## 2017-12-20 RX ADMIN — OXYCODONE HYDROCHLORIDE 10 MG: 10 TABLET, FILM COATED, EXTENDED RELEASE ORAL at 09:05

## 2017-12-20 RX ADMIN — PREGABALIN 75 MG: 75 CAPSULE ORAL at 09:05

## 2017-12-20 RX ADMIN — Medication 5 ML: at 05:00

## 2017-12-20 RX ADMIN — TRAMADOL HYDROCHLORIDE 50 MG: 50 TABLET, FILM COATED ORAL at 09:05

## 2017-12-20 RX ADMIN — ACETAMINOPHEN 650 MG: 325 TABLET ORAL at 09:05

## 2017-12-20 NOTE — ROUTINE PROCESS
Bedside and Verbal shift change report given to Aidee Prabhakar RN (oncoming nurse) by Sonia Ladd RN (offgoing nurse). Report included the following information SBAR, Kardex, Procedure Summary, Intake/Output, MAR, Recent Results and Med Rec Status.

## 2017-12-20 NOTE — PROGRESS NOTES
Problem: Mobility Impaired (Adult and Pediatric)  Goal: *Acute Goals and Plan of Care (Insert Text)  Physical Therapy Goals LT/ST  Initiated 12/18/2017 and to be accomplished within 3-5 day(s)  1. Patient will move from supine <> sit with S in prep for out of bed activity and change of position. 2.  Patient will perform sit<> stand with S with LRAD in prep for transfers/ambulation. 3.  Patient will transfer from bed <> chair with S with LRAD for time up in chair for completion of ADL activity. 4.  Patient will ambulate 150 feet with LRAD/S for improved functional mobility/safe discharge. 5.  Patient will ascend/descend  > 1 step with minimal assistance/contact guard assist for home re-entry as needed. PT session held due to:  [x]  Nausea/vomiting  [x]  RN Communication/ suggestion: Pt currently in bathroom with nursing aide   []  Extreme Pain  []  Dialysis treatment in progress. Will f/u later. Thank you.   Varsha Rivera, PTA

## 2017-12-20 NOTE — PROGRESS NOTES
[facility] care of pt at this time. Assessment complete. Pt alert and oriented x 4. Denies SOB and chest pain. Pt lungs clear bilaterally. Cap refill  less than 3 seconds. Pt denies numbness and tingling to all extremities. Stated pain 8/10. Pt has 18 G IV to L forearm. Pt has ACE bandage dressing with old drainage to left knee and ABD pad with old drainage to right  . Plexis  And TEDs bilaterally Pt encouraged to continue use of IS. Pt verbalized understanding. Ice pack applied. Call light and possessions within reach. Bed in low position. Will continue to monitor. 0830  Nurse informed that pt transport for rehab is set up for 1pm today. [address] aware that pt is being d/c to rehab facility. TRANSFER - OUT REPORT:    Verbal report given to [address] [place] [address] 51 [facility](name[facility]  [facility] rehab(unit) for routine progression of care       Report consisted of patients Situation, Background, Assessment and   Recommendations(SBAR). Information from the following report(s) SBAR, Kardex, [facility] and Recent Results was reviewed with the receiving nurse. Lines:   Peripheral IV 12/18/17 Left Forearm (Active)   Site Assessment Clean, dry, & intact 12/20/2017  7:53 AM   Phlebitis Assessment 0 12/20/2017  7:53 AM   Infiltration Assessment 0 12/20/2017  7:53 AM   Dressing Status Clean, dry, & intact 12/20/2017  7:53 AM   Dressing Type Tape;Transparent 12/20/2017  7:53 AM   Hub Color/Line Status Green;Capped 12/20/2017  7:53 AM   Action Taken Open ports on tubing capped 12/20/2017  7:53 AM   Alcohol Cap Used Yes 12/20/2017  7:53 AM        Opportunity for questions and clarification was provided.       Patient transported with:  Personal belongings

## 2017-12-20 NOTE — PROGRESS NOTES
POD2  Pisano in place  Progressing with PT Both incisions healing well  Stable  Plan: Transfer to [facility] with pisano

## 2017-12-20 NOTE — DISCHARGE SUMMARY
[facility]    Dave Epperson  MR#: [id number]  : 1945  ACCOUNT #: [de-identified]   DATE OF SERVICE: 2017    TRANSFER SUMMARY    Going to [facility] today. CHIEF COMPLAINT:  Bilateral knee pain. HISTORY: This is a 71-year-old male admitted for bilateral medial unicondylar knee replacements. 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 HOSPITAL COURSE:  The patient was admitted to the hospital and underwent bilateral medial unicondylar knee replacements. Postoperatively, he has done very well. He has developed urinary retention requiring repeat straight cath. Ultimately a urologist, Dr. Shahnaz Farias, was consulted and a Bowers catheter was placed, which remains in place at this point. The patient's incisions are healing well. [facility] and [facility]. FINAL DIAGNOSIS:  1.  Bilateral knee medial compartment degenerative joint disease. 2.  History of infant galactosemia. 3.  Urinary retention. A Bowers catheter currently in place. PLAN:  The patient will be transferred to [facility]. His activity is full weightbearing utilizing a walker. No CPM machine should be utilized. The patient has Prineo glue dressings over his incisions, which should not be removed. The patient has ABDs over this, which can be changed and over this he needs to keep his thigh high XANDER stockings full time including at discharge. The patient's diet is regular. MEDICATIONS:  See med rec sheet attached prescriptions. Follow up will be in the office in 2 weeks. Questions concerning removal of the Bowers catheter or follow up in Dr. Dylan Arenas office for the catheter should be directed to Dr. Shahnaz Farias.       MD FAIZA Hanks / ARON  D: 2017 09:27     T: 2017 13:37  JOB #: [id number]

## 2017-12-20 NOTE — ROUTINE PROCESS
Bedside shift change report given to brady pina (oncoming nurse) by Ismael CORRALES RN (offgoing nurse). Report included the following information SBAR, Kardex and MAR.

## 2017-12-20 NOTE — PROGRESS NOTES
Pt unable to void. Nursing staff unable to place pisano. 16 fr coude placed without difficulty.   Over 1000ml clear urine obtained  Imelda Christensen MD

## 2017-12-20 NOTE — PROGRESS NOTES
56 - Received report from Erasmo Sharma RN(offgoing nurse). Assumed care of PT. PT assessed. PT A&Ox4. Neuro WDL. PT is safe in BED. Respiratory WDL. Wound bilateral knee(location) with left tape/ace wrap(light drainage) & right with ABD & dav hose (light drainage) (dressing). Bed in low. Bedrails x3. Pain is 0(number) out of 10. PT oriented to room & given instructions regarding call bell, room phone, medications, and pain medications with potential side effects. PT encouraged to use call bell. Phone & call bell left in reach of PT. IV 18G left forearm.

## 2018-01-04 NOTE — OP NOTES
[address] REPORT    Huseyin García.  MR#: [id number]  : 1945  ACCOUNT #: [de-identified]   DATE OF SERVICE: 2017    SURGEON:  Dr. Juanjo Velázquez    ANESTHESIOLOGIST:  Dr. Flanagan Hams:  It was done under general anesthesia. ESTIMATED BLOOD LOSS:  Blood loss was a total of 50. SPECIMENS:  None. COMPLICATIONS:  None. INDICATIONS:  A 78-year-old male admitted for bilateral medial unicondylar knee replacements. PREOPERATIVE DIAGNOSIS:  The preoperative diagnosis is bilateral knee medial compartment degenerative joint disease. OPERATION:  Bilateral ConforMIS medial unicondylar knee replacement. DESCRIPTION:  The patient was brought to the operating room after receiving antibiotics. General anesthesia was administered, tourniquet was placed on both thighs. Both lower extremities were prepped and draped sterilely. The right knee was done first.  After exsanguination, the tourniquet was inflated to 350 mmHg. At this point, you have a template for the ConforMIS iUni, make it for the right side and then followed by the same narrative for the left side. Conclude then by saying the patient remained neurovascularly intact and went to recovery in stable condition.       MD Harvinder Avilez / Luly Hawkins  D: 2018 12:56     T: 2018 13:31  JOB #: [id number]

## 2019-03-06 ENCOUNTER — HOSPITAL ENCOUNTER (OUTPATIENT)
Dept: CT IMAGING | Age: 74
Discharge: HOME | End: 2019-03-06
Attending: ORTHOPAEDIC SURGERY
Payer: MEDICARE

## 2019-03-06 DIAGNOSIS — M25.521 RIGHT ELBOW PAIN: ICD-10-CM

## 2019-03-06 PROCEDURE — 73200 CT UPPER EXTREMITY W/O DYE: CPT

## 2019-11-19 ENCOUNTER — HOME HEALTH ADMISSION (OUTPATIENT)
Dept: HOME HEALTH SERVICES | Facility: HOME HEALTH | Age: 74
End: 2019-11-19

## 2019-11-21 ENCOUNTER — HOME CARE VISIT (OUTPATIENT)
Dept: HOME HEALTH SERVICES | Facility: HOME HEALTH | Age: 74
End: 2019-11-21

## 2019-11-22 ENCOUNTER — HOME CARE VISIT (OUTPATIENT)
Dept: SCHEDULING | Facility: HOME HEALTH | Age: 74
End: 2019-11-22

## 2021-08-03 PROBLEM — M19.90 DJD (DEGENERATIVE JOINT DISEASE): Status: RESOLVED | Noted: 2017-12-18 | Resolved: 2021-08-03

## (undated) DEVICE — DISPOSABLE TOURNIQUET CUFF SINGLE BLADDER, SINGLE PORT AND QUICK CONNECT CONNECTOR: Brand: COLOR CUFF

## (undated) DEVICE — REM POLYHESIVE ADULT PATIENT RETURN ELECTRODE: Brand: VALLEYLAB

## (undated) DEVICE — SOLUTION LACTATED RINGERS INJECTION USP

## (undated) DEVICE — SINGLE PORT MANIFOLD: Brand: NEPTUNE 2

## (undated) DEVICE — STRYKER PERFORMANCE SERIES SAGITTAL BLADE: Brand: STRYKER PERFORMANCE SERIES

## (undated) DEVICE — SYSTEM SKIN CLSR 22CM DERMBND PRINEO

## (undated) DEVICE — (D)PREP SKN CHLRAPRP APPL 26ML -- CONVERT TO ITEM 371833

## (undated) DEVICE — BLADE SAW W11.2XL77.5MM THK0.76MM CUT THK1.17MM REPL RECIP

## (undated) DEVICE — 3 BONE CEMENT MIXER: Brand: MIXEVAC

## (undated) DEVICE — SOL IRR STRL H2O 1500ML BTL --

## (undated) DEVICE — DRSG MEPILEX BORDER AG 4X8 -- 5/BX

## (undated) DEVICE — SOL INJ SOD CL 0.9% 100ML BG --

## (undated) DEVICE — TOTAL KNEE PACK-LF: Brand: MEDLINE INDUSTRIES, INC.

## (undated) DEVICE — NEEDLE HYPO 21GA L1.5IN INTRAMUSCULAR S STL LATCH BVL UP

## (undated) DEVICE — DEVON™ KNEE AND BODY STRAP 60" X 3" (1.5 M X 7.6 CM): Brand: DEVON

## (undated) DEVICE — STERILE POLYISOPRENE POWDER-FREE SURGICAL GLOVES: Brand: PROTEXIS

## (undated) DEVICE — SYRINGE MED 20ML STD CLR PLAS LUERLOCK TIP N CTRL DISP

## (undated) DEVICE — SUTURE PDS + SZ 1 L96IN ABSRB VLT L65MM TP-1 1/2 CIR PDP880G

## (undated) DEVICE — STRYKER RECIPROCATOR BLADE REPLACEMENT, 12.5 X 76 X 0.9 MM: Brand: CONMED

## (undated) DEVICE — ANTI-EMBOLISM STOCKINGS,THIGH LENGTH WITH BELT,X-LARGE,LONG,SIZE H+: Brand: T.E.D.

## (undated) DEVICE — 5.0MM ROUND FLUTED SOFT TOUCH

## (undated) DEVICE — STERILE POLYISOPRENE POWDER-FREE SURGICAL GLOVES WITH EMOLLIENT COATING: Brand: PROTEXIS

## (undated) DEVICE — PLUS HANDPIECE WITH SPRAY TIP: Brand: SURGILAV

## (undated) DEVICE — SUTURE VCRL 2-0 L27IN ABSRB UD OS-6 L36MM 1/2 CIR REV CUT J533H

## (undated) DEVICE — SOL IRRIGATION INJ NACL 0.9% 500ML BTL

## (undated) DEVICE — SOL INJ SOD CL 0.9% 250ML BG --

## (undated) DEVICE — CONCISE CEMENT SCULPS KIT: Brand: CONCISE

## (undated) DEVICE — KENDALL SCD EXPRESS SLEEVES, KNEE LENGTH, MEDIUM: Brand: KENDALL SCD

## (undated) DEVICE — SYR LR LCK 1ML GRAD NSAF 30ML --

## (undated) DEVICE — BIPOLAR SEALER 23-301-1 AQM MBS: Brand: AQUAMANTYS™